# Patient Record
Sex: MALE | Race: ASIAN | NOT HISPANIC OR LATINO | URBAN - METROPOLITAN AREA
[De-identification: names, ages, dates, MRNs, and addresses within clinical notes are randomized per-mention and may not be internally consistent; named-entity substitution may affect disease eponyms.]

---

## 2018-08-10 ENCOUNTER — INPATIENT (INPATIENT)
Facility: HOSPITAL | Age: 38
LOS: 9 days | Discharge: HOME | End: 2018-08-20
Attending: PSYCHIATRY & NEUROLOGY | Admitting: PSYCHIATRY & NEUROLOGY
Payer: MEDICAID

## 2018-08-10 VITALS
TEMPERATURE: 99 F | DIASTOLIC BLOOD PRESSURE: 98 MMHG | SYSTOLIC BLOOD PRESSURE: 141 MMHG | HEART RATE: 90 BPM | RESPIRATION RATE: 16 BRPM | OXYGEN SATURATION: 100 %

## 2018-08-10 DIAGNOSIS — F10.10 ALCOHOL ABUSE, UNCOMPLICATED: ICD-10-CM

## 2018-08-10 DIAGNOSIS — F29 UNSPECIFIED PSYCHOSIS NOT DUE TO A SUBSTANCE OR KNOWN PHYSIOLOGICAL CONDITION: ICD-10-CM

## 2018-08-10 DIAGNOSIS — F20.9 SCHIZOPHRENIA, UNSPECIFIED: ICD-10-CM

## 2018-08-10 LAB
ALBUMIN SERPL ELPH-MCNC: 4.5 G/DL — SIGNIFICANT CHANGE UP (ref 3.3–5)
ALP SERPL-CCNC: 52 U/L — SIGNIFICANT CHANGE UP (ref 40–120)
ALT FLD-CCNC: 60 U/L — HIGH (ref 4–41)
AMPHET UR-MCNC: NEGATIVE — SIGNIFICANT CHANGE UP
APAP SERPL-MCNC: < 15 UG/ML — LOW (ref 15–25)
APPEARANCE UR: CLEAR — SIGNIFICANT CHANGE UP
AST SERPL-CCNC: 46 U/L — HIGH (ref 4–40)
BARBITURATES UR SCN-MCNC: NEGATIVE — SIGNIFICANT CHANGE UP
BASOPHILS # BLD AUTO: 0.05 K/UL — SIGNIFICANT CHANGE UP (ref 0–0.2)
BASOPHILS NFR BLD AUTO: 0.7 % — SIGNIFICANT CHANGE UP (ref 0–2)
BENZODIAZ UR-MCNC: NEGATIVE — SIGNIFICANT CHANGE UP
BILIRUB SERPL-MCNC: 0.4 MG/DL — SIGNIFICANT CHANGE UP (ref 0.2–1.2)
BILIRUB UR-MCNC: NEGATIVE — SIGNIFICANT CHANGE UP
BLOOD UR QL VISUAL: NEGATIVE — SIGNIFICANT CHANGE UP
BUN SERPL-MCNC: 14 MG/DL — SIGNIFICANT CHANGE UP (ref 7–23)
CALCIUM SERPL-MCNC: 8.8 MG/DL — SIGNIFICANT CHANGE UP (ref 8.4–10.5)
CANNABINOIDS UR-MCNC: NEGATIVE — SIGNIFICANT CHANGE UP
CHLORIDE SERPL-SCNC: 102 MMOL/L — SIGNIFICANT CHANGE UP (ref 98–107)
CO2 SERPL-SCNC: 21 MMOL/L — LOW (ref 22–31)
COCAINE METAB.OTHER UR-MCNC: NEGATIVE — SIGNIFICANT CHANGE UP
COLOR SPEC: COLORLESS — SIGNIFICANT CHANGE UP
CREAT SERPL-MCNC: 0.75 MG/DL — SIGNIFICANT CHANGE UP (ref 0.5–1.3)
EOSINOPHIL # BLD AUTO: 0.27 K/UL — SIGNIFICANT CHANGE UP (ref 0–0.5)
EOSINOPHIL NFR BLD AUTO: 4 % — SIGNIFICANT CHANGE UP (ref 0–6)
ETHANOL BLD-MCNC: < 10 MG/DL — SIGNIFICANT CHANGE UP
GLUCOSE SERPL-MCNC: 102 MG/DL — HIGH (ref 70–99)
GLUCOSE UR-MCNC: NEGATIVE — SIGNIFICANT CHANGE UP
HCT VFR BLD CALC: 44.4 % — SIGNIFICANT CHANGE UP (ref 39–50)
HGB BLD-MCNC: 15 G/DL — SIGNIFICANT CHANGE UP (ref 13–17)
IMM GRANULOCYTES # BLD AUTO: 0.01 # — SIGNIFICANT CHANGE UP
IMM GRANULOCYTES NFR BLD AUTO: 0.1 % — SIGNIFICANT CHANGE UP (ref 0–1.5)
KETONES UR-MCNC: NEGATIVE — SIGNIFICANT CHANGE UP
LEUKOCYTE ESTERASE UR-ACNC: NEGATIVE — SIGNIFICANT CHANGE UP
LYMPHOCYTES # BLD AUTO: 2.09 K/UL — SIGNIFICANT CHANGE UP (ref 1–3.3)
LYMPHOCYTES # BLD AUTO: 30.6 % — SIGNIFICANT CHANGE UP (ref 13–44)
MCHC RBC-ENTMCNC: 31.3 PG — SIGNIFICANT CHANGE UP (ref 27–34)
MCHC RBC-ENTMCNC: 33.8 % — SIGNIFICANT CHANGE UP (ref 32–36)
MCV RBC AUTO: 92.7 FL — SIGNIFICANT CHANGE UP (ref 80–100)
METHADONE UR-MCNC: NEGATIVE — SIGNIFICANT CHANGE UP
MONOCYTES # BLD AUTO: 0.5 K/UL — SIGNIFICANT CHANGE UP (ref 0–0.9)
MONOCYTES NFR BLD AUTO: 7.3 % — SIGNIFICANT CHANGE UP (ref 2–14)
NEUTROPHILS # BLD AUTO: 3.91 K/UL — SIGNIFICANT CHANGE UP (ref 1.8–7.4)
NEUTROPHILS NFR BLD AUTO: 57.3 % — SIGNIFICANT CHANGE UP (ref 43–77)
NITRITE UR-MCNC: NEGATIVE — SIGNIFICANT CHANGE UP
NRBC # FLD: 0 — SIGNIFICANT CHANGE UP
OPIATES UR-MCNC: NEGATIVE — SIGNIFICANT CHANGE UP
OXYCODONE UR-MCNC: NEGATIVE — SIGNIFICANT CHANGE UP
PCP UR-MCNC: NEGATIVE — SIGNIFICANT CHANGE UP
PH UR: 7 — SIGNIFICANT CHANGE UP (ref 4.6–8)
PLATELET # BLD AUTO: 303 K/UL — SIGNIFICANT CHANGE UP (ref 150–400)
PMV BLD: 9.1 FL — SIGNIFICANT CHANGE UP (ref 7–13)
POTASSIUM SERPL-MCNC: 3.8 MMOL/L — SIGNIFICANT CHANGE UP (ref 3.5–5.3)
POTASSIUM SERPL-SCNC: 3.8 MMOL/L — SIGNIFICANT CHANGE UP (ref 3.5–5.3)
PROT SERPL-MCNC: 7 G/DL — SIGNIFICANT CHANGE UP (ref 6–8.3)
PROT UR-MCNC: NEGATIVE MG/DL — SIGNIFICANT CHANGE UP
RBC # BLD: 4.79 M/UL — SIGNIFICANT CHANGE UP (ref 4.2–5.8)
RBC # FLD: 13 % — SIGNIFICANT CHANGE UP (ref 10.3–14.5)
SALICYLATES SERPL-MCNC: < 5 MG/DL — LOW (ref 15–30)
SODIUM SERPL-SCNC: 139 MMOL/L — SIGNIFICANT CHANGE UP (ref 135–145)
SP GR SPEC: 1.01 — SIGNIFICANT CHANGE UP (ref 1–1.04)
TSH SERPL-MCNC: 1.13 UIU/ML — SIGNIFICANT CHANGE UP (ref 0.27–4.2)
UROBILINOGEN FLD QL: NORMAL MG/DL — SIGNIFICANT CHANGE UP
WBC # BLD: 6.83 K/UL — SIGNIFICANT CHANGE UP (ref 3.8–10.5)
WBC # FLD AUTO: 6.83 K/UL — SIGNIFICANT CHANGE UP (ref 3.8–10.5)
WBC UR QL: SIGNIFICANT CHANGE UP (ref 0–?)

## 2018-08-10 PROCEDURE — 99222 1ST HOSP IP/OBS MODERATE 55: CPT

## 2018-08-10 PROCEDURE — 99285 EMERGENCY DEPT VISIT HI MDM: CPT

## 2018-08-10 RX ORDER — TRAZODONE HCL 50 MG
50 TABLET ORAL AT BEDTIME
Qty: 0 | Refills: 0 | Status: DISCONTINUED | OUTPATIENT
Start: 2018-08-10 | End: 2018-08-20

## 2018-08-10 RX ORDER — HALOPERIDOL DECANOATE 100 MG/ML
5 INJECTION INTRAMUSCULAR ONCE
Qty: 0 | Refills: 0 | Status: COMPLETED | OUTPATIENT
Start: 2018-08-10 | End: 2018-08-10

## 2018-08-10 RX ORDER — ATENOLOL 25 MG/1
100 TABLET ORAL DAILY
Qty: 0 | Refills: 0 | Status: DISCONTINUED | OUTPATIENT
Start: 2018-08-10 | End: 2018-08-20

## 2018-08-10 RX ORDER — DIPHENHYDRAMINE HCL 50 MG
25 CAPSULE ORAL EVERY 6 HOURS
Qty: 0 | Refills: 0 | Status: DISCONTINUED | OUTPATIENT
Start: 2018-08-10 | End: 2018-08-11

## 2018-08-10 RX ORDER — RISPERIDONE 4 MG/1
1 TABLET ORAL
Qty: 0 | Refills: 0 | Status: DISCONTINUED | OUTPATIENT
Start: 2018-08-10 | End: 2018-08-11

## 2018-08-10 RX ORDER — DIPHENHYDRAMINE HCL 50 MG
25 CAPSULE ORAL ONCE
Qty: 0 | Refills: 0 | Status: COMPLETED | OUTPATIENT
Start: 2018-08-10 | End: 2018-08-10

## 2018-08-10 RX ORDER — HALOPERIDOL DECANOATE 100 MG/ML
2 INJECTION INTRAMUSCULAR THREE TIMES A DAY
Qty: 0 | Refills: 0 | Status: DISCONTINUED | OUTPATIENT
Start: 2018-08-10 | End: 2018-08-11

## 2018-08-10 RX ORDER — NICOTINE POLACRILEX 2 MG
2 GUM BUCCAL
Qty: 0 | Refills: 0 | Status: DISCONTINUED | OUTPATIENT
Start: 2018-08-10 | End: 2018-08-20

## 2018-08-10 RX ORDER — BENZTROPINE MESYLATE 1 MG
0.5 TABLET ORAL
Qty: 0 | Refills: 0 | Status: DISCONTINUED | OUTPATIENT
Start: 2018-08-10 | End: 2018-08-20

## 2018-08-10 RX ORDER — IBUPROFEN 200 MG
600 TABLET ORAL ONCE
Qty: 0 | Refills: 0 | Status: COMPLETED | OUTPATIENT
Start: 2018-08-10 | End: 2018-08-10

## 2018-08-10 RX ADMIN — Medication 600 MILLIGRAM(S): at 22:50

## 2018-08-10 RX ADMIN — Medication 25 MILLIGRAM(S): at 05:08

## 2018-08-10 RX ADMIN — Medication 50 MILLIGRAM(S): at 21:50

## 2018-08-10 RX ADMIN — ATENOLOL 100 MILLIGRAM(S): 25 TABLET ORAL at 12:02

## 2018-08-10 RX ADMIN — Medication 0.5 MILLIGRAM(S): at 21:25

## 2018-08-10 RX ADMIN — Medication 600 MILLIGRAM(S): at 21:50

## 2018-08-10 RX ADMIN — RISPERIDONE 1 MILLIGRAM(S): 4 TABLET ORAL at 21:25

## 2018-08-10 RX ADMIN — Medication 25 MILLIGRAM(S): at 21:50

## 2018-08-10 RX ADMIN — Medication 2 MILLIGRAM(S): at 05:07

## 2018-08-10 RX ADMIN — HALOPERIDOL DECANOATE 5 MILLIGRAM(S): 100 INJECTION INTRAMUSCULAR at 05:07

## 2018-08-10 RX ADMIN — Medication 2 MILLIGRAM(S): at 22:53

## 2018-08-10 NOTE — ED BEHAVIORAL HEALTH ASSESSMENT NOTE - OTHER PAST PSYCHIATRIC HISTORY (INCLUDE DETAILS REGARDING ONSET, COURSE OF ILLNESS, INPATIENT/OUTPATIENT TREATMENT)
one psych admission after patient had a episode of agitaion; he felt "discriminated and decided to fix it with love" as per the pt He went to the "lady's house- backyard ?; and refused to leave Police took him to the hospital and patient was admitted for 4 days HE was started on meds but became non-compliant

## 2018-08-10 NOTE — ED PROVIDER NOTE - OBJECTIVE STATEMENT
38M per pt - has PMH depression, presents to ED bec family convinced him to come to ED 2/2 pt yelling tonight and they think "I'm crazy." States he started some meds 4d ago for his depression but not sure what they are. Denies any current SI/HI but states 2-3w he tried hurting himself, but when I attempted to ask further questions, pt refused to talk about it. Denies HA, SOB/CP, URI symptoms, vision changes, weakness/numbness, abd pain, urinary complaints. Denies SI/HI, toxic ingestions. Does admit to etoh, drank beer today.   Per family (cousin, friend and dad w/ pt): Pt recently admitted to psych at [facility], was dx w/ possible bipolar/schizophrenia and started on risperidone, benztropine, doxepin but they don't think hes taking it. 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 also increasingly agitated and starting up with random people in the street.   Cousin Chato jailene [phone]

## 2018-08-10 NOTE — ED BEHAVIORAL HEALTH ASSESSMENT NOTE - CURRENT MEDICATION
non-compliant :he is supposed to be on doxepin 50 mg  QHS   Risperdal 1 mg BID   cogentin 0.5 mg BID  atenolol 100 mg daily

## 2018-08-10 NOTE — ED ADULT NURSE NOTE - NSIMPLEMENTINTERV_GEN_ALL_ED
Implemented All Universal Safety Interventions:  [place] to call system. Call bell, personal items and telephone within reach. Instruct patient to call for assistance. Room bathroom lighting operational. Non-slip footwear when patient is off stretcher. Physically safe environment: no spills, clutter or unnecessary equipment. Stretcher in lowest position, wheels locked, appropriate side rails in place.

## 2018-08-10 NOTE — ED BEHAVIORAL HEALTH ASSESSMENT NOTE - SUMMARY
39 yo  male with one psych admission ; currently psychotic, bizarre, with some manic symptoms, non-compliant with meds family is worried about him; daughter and wife are scared of him  Patient needs psychiatric hospitalization for stabilization and safety

## 2018-08-10 NOTE — ED ADULT NURSE REASSESSMENT NOTE - NS ED NURSE REASSESS COMMENT FT1
Pt becoming agitated and restless; pt banging his head on the wall, pacing and wringing hands with increasing internal preoccupation.  Pt medicated as ordered.
pt left with EMS to   pt calm & cooperative no c/o verbalized pt aware of admission.
Break Coverage RN - Report received from  RN Antione Dutton. No acute distress at present. Respirations are even and unlabored on room air. EKG in progress. Will continue to monitor.
Pt sleeping; respirations even and non labored. Pt awaiting admission bed at [facility].
Pt transfer vitals as noted; report called to [facility] on [address] at [facility]; pt awaiting transfer to admission bed via [facility].

## 2018-08-10 NOTE — ED PROVIDER NOTE - MEDICAL DECISION MAKING DETAILS
38M PMH ?depresison vs. bioplar/schiophrenia p/w increasing agitation, family concern for delusions, increasing self-harm behaviors. Pt denies any current symptoms but admits to recent SI and very evasive with further questioning. Admits to etoh. Vitals wnl, exam as above.  ddx: Likely combo psych and substance use. May be slight etoh intox.   CBC, cmp, TSh, tox. EKG. Given family concerns and pt evasiveness, likely medical clearance for further BH eval.

## 2018-08-10 NOTE — ED ADULT TRIAGE NOTE - CHIEF COMPLAINT QUOTE
pt drank 8 beers total today since this morning. as per family pt is supposed to be on risperidone and is noncompliant. pt drinks daily since february. family brought him here for "help controlling his anger and drinking". pt calm in triage. pmh htn, possible schizophrenia/bipolar pt drank 8 beers total today since this morning. as per family pt is supposed to be on risperidone and is noncompliant. pt drinks daily since february. family brought him here for "help controlling his anger and drinking". pt calm in triage. denies SI/HI, denies drug use. pt family states he has expressed SI in the past but pt denies. pmh htn, possible schizophrenia/bipolar
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
## 2018-08-10 NOTE — ED BEHAVIORAL HEALTH ASSESSMENT NOTE - DESCRIPTION (FIRST USE, LAST USE, QUANTITY, FREQUENCY, DURATION)
abuse vs dependence, lately he ahs been drinking 2-3 beers "almost every day" No HX of w/d  Patient also has Hx of "liver failure as per the cousin ; he "was in coma after he took cold medicine with alcohol; but suddenly the liver started working again"

## 2018-08-10 NOTE — ED BEHAVIORAL HEALTH ASSESSMENT NOTE - SUICIDE PROTECTIVE FACTORS
Responsibility to family and others/Supportive social network or family/Identifies reasons for living/High spirituality/Future oriented

## 2018-08-10 NOTE — ED BEHAVIORAL HEALTH ASSESSMENT NOTE - SUICIDE RISK FACTORS
Mood episode/Other/Access to means (pills, firearms, etc.)/Substance abuse/dependence/Agitation/severe anxiety/Unable to engage in safety planning

## 2018-08-10 NOTE — ED PROVIDER NOTE - PHYSICAL EXAMINATION
no LE edema, normal equal distal pulses.  No clonus, rigidity, tremors, fasciculations. PERRL, EOMI, no nystagmus. Strength 5/5. Steady unassisted gait. Normal bowel sounds, skin temp/color.  Slight AOB.
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
## 2018-08-10 NOTE — ED BEHAVIORAL HEALTH ASSESSMENT NOTE - RISK ASSESSMENT
risk factors: poor insight; non-compliance with meds, acute psychosis , mood symptoms  protective factors: No hx of SA, supportive family

## 2018-08-10 NOTE — ED ADULT NURSE NOTE - CHIEF COMPLAINT QUOTE
pt drank 8 beers total today since this morning. as per family pt is supposed to be on risperidone and is noncompliant. pt drinks daily since february. family brought him here for "help controlling his anger and drinking". pt calm in triage. denies SI/HI, denies drug use. pt family states he has expressed SI in the past but pt denies. pmh htn, possible schizophrenia/bipolar

## 2018-08-11 PROCEDURE — 99232 SBSQ HOSP IP/OBS MODERATE 35: CPT

## 2018-08-11 RX ORDER — DIPHENHYDRAMINE HCL 50 MG
25 CAPSULE ORAL ONCE
Qty: 0 | Refills: 0 | Status: DISCONTINUED | OUTPATIENT
Start: 2018-08-11 | End: 2018-08-11

## 2018-08-11 RX ORDER — DIPHENHYDRAMINE HCL 50 MG
50 CAPSULE ORAL ONCE
Qty: 0 | Refills: 0 | Status: DISCONTINUED | OUTPATIENT
Start: 2018-08-11 | End: 2018-08-20

## 2018-08-11 RX ORDER — RISPERIDONE 4 MG/1
3 TABLET ORAL AT BEDTIME
Qty: 0 | Refills: 0 | Status: DISCONTINUED | OUTPATIENT
Start: 2018-08-11 | End: 2018-08-12

## 2018-08-11 RX ORDER — DIPHENHYDRAMINE HCL 50 MG
50 CAPSULE ORAL EVERY 6 HOURS
Qty: 0 | Refills: 0 | Status: DISCONTINUED | OUTPATIENT
Start: 2018-08-11 | End: 2018-08-20

## 2018-08-11 RX ORDER — ACETAMINOPHEN 500 MG
650 TABLET ORAL EVERY 6 HOURS
Qty: 0 | Refills: 0 | Status: DISCONTINUED | OUTPATIENT
Start: 2018-08-11 | End: 2018-08-17

## 2018-08-11 RX ORDER — HALOPERIDOL DECANOATE 100 MG/ML
5 INJECTION INTRAMUSCULAR EVERY 6 HOURS
Qty: 0 | Refills: 0 | Status: DISCONTINUED | OUTPATIENT
Start: 2018-08-11 | End: 2018-08-20

## 2018-08-11 RX ORDER — HALOPERIDOL DECANOATE 100 MG/ML
5 INJECTION INTRAMUSCULAR ONCE
Qty: 0 | Refills: 0 | Status: DISCONTINUED | OUTPATIENT
Start: 2018-08-11 | End: 2018-08-20

## 2018-08-11 RX ADMIN — Medication 50 MILLIGRAM(S): at 20:52

## 2018-08-11 RX ADMIN — Medication 0.5 MILLIGRAM(S): at 09:28

## 2018-08-11 RX ADMIN — Medication 50 MILLIGRAM(S): at 03:47

## 2018-08-11 RX ADMIN — HALOPERIDOL DECANOATE 5 MILLIGRAM(S): 100 INJECTION INTRAMUSCULAR at 20:52

## 2018-08-11 RX ADMIN — Medication 50 MILLIGRAM(S): at 09:25

## 2018-08-11 RX ADMIN — ATENOLOL 100 MILLIGRAM(S): 25 TABLET ORAL at 09:28

## 2018-08-11 RX ADMIN — HALOPERIDOL DECANOATE 5 MILLIGRAM(S): 100 INJECTION INTRAMUSCULAR at 09:25

## 2018-08-11 RX ADMIN — Medication 650 MILLIGRAM(S): at 09:20

## 2018-08-11 RX ADMIN — Medication 1 DROP(S): at 11:30

## 2018-08-11 RX ADMIN — Medication 0.5 MILLIGRAM(S): at 21:16

## 2018-08-11 RX ADMIN — Medication 650 MILLIGRAM(S): at 10:44

## 2018-08-11 RX ADMIN — Medication 2 MILLIGRAM(S): at 20:52

## 2018-08-11 RX ADMIN — RISPERIDONE 3 MILLIGRAM(S): 4 TABLET ORAL at 21:16

## 2018-08-11 RX ADMIN — Medication 2 MILLIGRAM(S): at 09:25

## 2018-08-11 RX ADMIN — Medication 2 MILLIGRAM(S): at 17:00

## 2018-08-11 RX ADMIN — Medication 25 MILLIGRAM(S): at 03:47

## 2018-08-12 PROCEDURE — 99231 SBSQ HOSP IP/OBS SF/LOW 25: CPT

## 2018-08-12 RX ORDER — NICOTINE POLACRILEX 2 MG
1 GUM BUCCAL DAILY
Qty: 0 | Refills: 0 | Status: DISCONTINUED | OUTPATIENT
Start: 2018-08-12 | End: 2018-08-20

## 2018-08-12 RX ORDER — RISPERIDONE 4 MG/1
4 TABLET ORAL AT BEDTIME
Qty: 0 | Refills: 0 | Status: DISCONTINUED | OUTPATIENT
Start: 2018-08-12 | End: 2018-08-20

## 2018-08-12 RX ADMIN — Medication 2 MILLIGRAM(S): at 21:13

## 2018-08-12 RX ADMIN — Medication 0.5 MILLIGRAM(S): at 09:34

## 2018-08-12 RX ADMIN — HALOPERIDOL DECANOATE 5 MILLIGRAM(S): 100 INJECTION INTRAMUSCULAR at 21:13

## 2018-08-12 RX ADMIN — ATENOLOL 100 MILLIGRAM(S): 25 TABLET ORAL at 09:34

## 2018-08-12 RX ADMIN — Medication 1 PATCH: at 13:23

## 2018-08-12 RX ADMIN — Medication 50 MILLIGRAM(S): at 21:13

## 2018-08-12 RX ADMIN — Medication 0.5 MILLIGRAM(S): at 21:17

## 2018-08-12 RX ADMIN — RISPERIDONE 4 MILLIGRAM(S): 4 TABLET ORAL at 21:17

## 2018-08-13 PROCEDURE — 99232 SBSQ HOSP IP/OBS MODERATE 35: CPT

## 2018-08-13 RX ORDER — IBUPROFEN 200 MG
400 TABLET ORAL EVERY 6 HOURS
Qty: 0 | Refills: 0 | Status: DISCONTINUED | OUTPATIENT
Start: 2018-08-13 | End: 2018-08-20

## 2018-08-13 RX ADMIN — Medication 0.5 MILLIGRAM(S): at 09:18

## 2018-08-13 RX ADMIN — Medication 650 MILLIGRAM(S): at 11:54

## 2018-08-13 RX ADMIN — Medication 0.5 MILLIGRAM(S): at 21:23

## 2018-08-13 RX ADMIN — ATENOLOL 100 MILLIGRAM(S): 25 TABLET ORAL at 09:18

## 2018-08-13 RX ADMIN — Medication 2 MILLIGRAM(S): at 21:23

## 2018-08-13 RX ADMIN — Medication 50 MILLIGRAM(S): at 01:00

## 2018-08-13 RX ADMIN — Medication 650 MILLIGRAM(S): at 11:25

## 2018-08-13 RX ADMIN — Medication 400 MILLIGRAM(S): at 15:31

## 2018-08-13 RX ADMIN — Medication 400 MILLIGRAM(S): at 14:31

## 2018-08-13 RX ADMIN — RISPERIDONE 4 MILLIGRAM(S): 4 TABLET ORAL at 21:23

## 2018-08-13 RX ADMIN — Medication 1 PATCH: at 09:18

## 2018-08-14 PROCEDURE — 99232 SBSQ HOSP IP/OBS MODERATE 35: CPT | Mod: GC

## 2018-08-14 RX ADMIN — Medication 0.5 MILLIGRAM(S): at 08:22

## 2018-08-14 RX ADMIN — ATENOLOL 100 MILLIGRAM(S): 25 TABLET ORAL at 08:22

## 2018-08-14 RX ADMIN — Medication 1 PATCH: at 08:22

## 2018-08-14 RX ADMIN — Medication 50 MILLIGRAM(S): at 21:56

## 2018-08-14 RX ADMIN — Medication 0.5 MILLIGRAM(S): at 21:09

## 2018-08-14 RX ADMIN — Medication 400 MILLIGRAM(S): at 08:22

## 2018-08-14 RX ADMIN — RISPERIDONE 4 MILLIGRAM(S): 4 TABLET ORAL at 21:09

## 2018-08-14 RX ADMIN — HALOPERIDOL DECANOATE 5 MILLIGRAM(S): 100 INJECTION INTRAMUSCULAR at 23:50

## 2018-08-14 RX ADMIN — Medication 400 MILLIGRAM(S): at 09:13

## 2018-08-14 RX ADMIN — Medication 2 MILLIGRAM(S): at 23:50

## 2018-08-14 RX ADMIN — Medication 400 MILLIGRAM(S): at 18:14

## 2018-08-15 PROCEDURE — 99232 SBSQ HOSP IP/OBS MODERATE 35: CPT | Mod: GC

## 2018-08-15 RX ADMIN — Medication 50 MILLIGRAM(S): at 22:35

## 2018-08-15 RX ADMIN — ATENOLOL 100 MILLIGRAM(S): 25 TABLET ORAL at 09:18

## 2018-08-15 RX ADMIN — Medication 0.5 MILLIGRAM(S): at 21:18

## 2018-08-15 RX ADMIN — Medication 400 MILLIGRAM(S): at 14:32

## 2018-08-15 RX ADMIN — HALOPERIDOL DECANOATE 5 MILLIGRAM(S): 100 INJECTION INTRAMUSCULAR at 22:35

## 2018-08-15 RX ADMIN — RISPERIDONE 4 MILLIGRAM(S): 4 TABLET ORAL at 21:18

## 2018-08-15 RX ADMIN — Medication 1 PATCH: at 09:19

## 2018-08-15 RX ADMIN — Medication 0.5 MILLIGRAM(S): at 09:18

## 2018-08-15 RX ADMIN — Medication 2 MILLIGRAM(S): at 17:19

## 2018-08-15 RX ADMIN — Medication 400 MILLIGRAM(S): at 15:34

## 2018-08-16 PROCEDURE — 99232 SBSQ HOSP IP/OBS MODERATE 35: CPT | Mod: GC

## 2018-08-16 RX ORDER — BENZOCAINE AND MENTHOL 5; 1 G/100ML; G/100ML
1 LIQUID ORAL THREE TIMES A DAY
Qty: 0 | Refills: 0 | Status: DISCONTINUED | OUTPATIENT
Start: 2018-08-16 | End: 2018-08-20

## 2018-08-16 RX ORDER — RISPERIDONE 4 MG/1
1 TABLET ORAL
Qty: 14 | Refills: 0
Start: 2018-08-16 | End: 2018-08-29

## 2018-08-16 RX ORDER — ATENOLOL 25 MG/1
1 TABLET ORAL
Qty: 14 | Refills: 0
Start: 2018-08-16 | End: 2018-08-29

## 2018-08-16 RX ORDER — BENZTROPINE MESYLATE 1 MG
1 TABLET ORAL
Qty: 28 | Refills: 0
Start: 2018-08-16 | End: 2018-08-29

## 2018-08-16 RX ADMIN — Medication 0.5 MILLIGRAM(S): at 08:43

## 2018-08-16 RX ADMIN — Medication 50 MILLIGRAM(S): at 11:05

## 2018-08-16 RX ADMIN — Medication 400 MILLIGRAM(S): at 17:00

## 2018-08-16 RX ADMIN — Medication 2 MILLIGRAM(S): at 21:46

## 2018-08-16 RX ADMIN — HALOPERIDOL DECANOATE 5 MILLIGRAM(S): 100 INJECTION INTRAMUSCULAR at 21:46

## 2018-08-16 RX ADMIN — Medication 400 MILLIGRAM(S): at 09:43

## 2018-08-16 RX ADMIN — HALOPERIDOL DECANOATE 5 MILLIGRAM(S): 100 INJECTION INTRAMUSCULAR at 11:05

## 2018-08-16 RX ADMIN — Medication 1 PATCH: at 08:43

## 2018-08-16 RX ADMIN — Medication 50 MILLIGRAM(S): at 21:46

## 2018-08-16 RX ADMIN — Medication 400 MILLIGRAM(S): at 16:00

## 2018-08-16 RX ADMIN — BENZOCAINE AND MENTHOL 1 LOZENGE: 5; 1 LIQUID ORAL at 06:38

## 2018-08-16 RX ADMIN — Medication 400 MILLIGRAM(S): at 08:43

## 2018-08-16 RX ADMIN — Medication 2 MILLIGRAM(S): at 00:00

## 2018-08-16 RX ADMIN — RISPERIDONE 4 MILLIGRAM(S): 4 TABLET ORAL at 20:53

## 2018-08-16 RX ADMIN — ATENOLOL 100 MILLIGRAM(S): 25 TABLET ORAL at 08:43

## 2018-08-16 RX ADMIN — Medication 2 MILLIGRAM(S): at 11:05

## 2018-08-16 RX ADMIN — BENZOCAINE AND MENTHOL 1 LOZENGE: 5; 1 LIQUID ORAL at 18:28

## 2018-08-16 RX ADMIN — Medication 0.5 MILLIGRAM(S): at 20:53

## 2018-08-17 PROCEDURE — 99232 SBSQ HOSP IP/OBS MODERATE 35: CPT | Mod: GC

## 2018-08-17 RX ADMIN — Medication 400 MILLIGRAM(S): at 13:57

## 2018-08-17 RX ADMIN — HALOPERIDOL DECANOATE 5 MILLIGRAM(S): 100 INJECTION INTRAMUSCULAR at 21:00

## 2018-08-17 RX ADMIN — BENZOCAINE AND MENTHOL 1 LOZENGE: 5; 1 LIQUID ORAL at 21:00

## 2018-08-17 RX ADMIN — Medication 1 PATCH: at 08:36

## 2018-08-17 RX ADMIN — ATENOLOL 100 MILLIGRAM(S): 25 TABLET ORAL at 08:36

## 2018-08-17 RX ADMIN — Medication 2 MILLIGRAM(S): at 21:00

## 2018-08-17 RX ADMIN — Medication 50 MILLIGRAM(S): at 21:00

## 2018-08-17 RX ADMIN — Medication 650 MILLIGRAM(S): at 08:36

## 2018-08-17 RX ADMIN — Medication 400 MILLIGRAM(S): at 21:34

## 2018-08-17 RX ADMIN — Medication 0.5 MILLIGRAM(S): at 08:36

## 2018-08-17 RX ADMIN — Medication 400 MILLIGRAM(S): at 21:00

## 2018-08-17 RX ADMIN — RISPERIDONE 4 MILLIGRAM(S): 4 TABLET ORAL at 21:08

## 2018-08-17 RX ADMIN — BENZOCAINE AND MENTHOL 1 LOZENGE: 5; 1 LIQUID ORAL at 08:36

## 2018-08-17 RX ADMIN — Medication 400 MILLIGRAM(S): at 13:53

## 2018-08-17 RX ADMIN — Medication 0.5 MILLIGRAM(S): at 21:08

## 2018-08-18 RX ORDER — ACETAMINOPHEN 500 MG
325 TABLET ORAL EVERY 6 HOURS
Qty: 0 | Refills: 0 | Status: DISCONTINUED | OUTPATIENT
Start: 2018-08-18 | End: 2018-08-20

## 2018-08-18 RX ORDER — ACETAMINOPHEN 500 MG
500 TABLET ORAL EVERY 6 HOURS
Qty: 0 | Refills: 0 | Status: DISCONTINUED | OUTPATIENT
Start: 2018-08-18 | End: 2018-08-18

## 2018-08-18 RX ADMIN — Medication 400 MILLIGRAM(S): at 11:40

## 2018-08-18 RX ADMIN — Medication 1 PATCH: at 08:57

## 2018-08-18 RX ADMIN — Medication 400 MILLIGRAM(S): at 18:49

## 2018-08-18 RX ADMIN — Medication 0.5 MILLIGRAM(S): at 08:57

## 2018-08-18 RX ADMIN — ATENOLOL 100 MILLIGRAM(S): 25 TABLET ORAL at 08:57

## 2018-08-18 RX ADMIN — Medication 400 MILLIGRAM(S): at 10:40

## 2018-08-18 RX ADMIN — Medication 50 MILLIGRAM(S): at 20:55

## 2018-08-18 RX ADMIN — Medication 0.5 MILLIGRAM(S): at 20:52

## 2018-08-18 RX ADMIN — RISPERIDONE 4 MILLIGRAM(S): 4 TABLET ORAL at 20:52

## 2018-08-18 RX ADMIN — HALOPERIDOL DECANOATE 5 MILLIGRAM(S): 100 INJECTION INTRAMUSCULAR at 20:55

## 2018-08-18 RX ADMIN — Medication 400 MILLIGRAM(S): at 18:00

## 2018-08-19 RX ADMIN — Medication 0.5 MILLIGRAM(S): at 20:46

## 2018-08-19 RX ADMIN — ATENOLOL 100 MILLIGRAM(S): 25 TABLET ORAL at 09:17

## 2018-08-19 RX ADMIN — Medication 400 MILLIGRAM(S): at 07:45

## 2018-08-19 RX ADMIN — Medication 1 PATCH: at 09:17

## 2018-08-19 RX ADMIN — Medication 50 MILLIGRAM(S): at 22:17

## 2018-08-19 RX ADMIN — Medication 50 MILLIGRAM(S): at 21:26

## 2018-08-19 RX ADMIN — RISPERIDONE 4 MILLIGRAM(S): 4 TABLET ORAL at 20:46

## 2018-08-19 RX ADMIN — Medication 400 MILLIGRAM(S): at 08:45

## 2018-08-19 RX ADMIN — Medication 0.5 MILLIGRAM(S): at 09:17

## 2018-08-20 VITALS — TEMPERATURE: 98 F

## 2018-08-20 LAB
CHOLEST SERPL-MCNC: 227 MG/DL — HIGH (ref 120–199)
HBA1C BLD-MCNC: 5.3 % — SIGNIFICANT CHANGE UP (ref 4–5.6)
HDLC SERPL-MCNC: 38 MG/DL — SIGNIFICANT CHANGE UP (ref 35–55)
LIPID PNL WITH DIRECT LDL SERPL: 152 MG/DL — SIGNIFICANT CHANGE UP
TRIGL SERPL-MCNC: 393 MG/DL — HIGH (ref 10–149)

## 2018-08-20 PROCEDURE — 99238 HOSP IP/OBS DSCHRG MGMT 30/<: CPT

## 2018-08-20 RX ADMIN — Medication 1 PATCH: at 09:49

## 2018-08-20 RX ADMIN — Medication 0.5 MILLIGRAM(S): at 09:48

## 2018-08-20 RX ADMIN — ATENOLOL 100 MILLIGRAM(S): 25 TABLET ORAL at 09:48

## 2018-08-20 RX ADMIN — Medication 1 PATCH: at 09:48

## 2018-08-27 ENCOUNTER — OUTPATIENT (OUTPATIENT)
Dept: OUTPATIENT SERVICES | Facility: HOSPITAL | Age: 38
LOS: 1 days | Discharge: PSYCHIATRIC OR SUBSTANCE USE FACILITY | End: 2018-08-27

## 2018-08-28 DIAGNOSIS — F20.81 SCHIZOPHRENIFORM DISORDER: ICD-10-CM

## 2021-07-08 ENCOUNTER — EMERGENCY (EMERGENCY)
Facility: HOSPITAL | Age: 41
LOS: 1 days | End: 2021-07-08
Admitting: EMERGENCY MEDICINE
Payer: COMMERCIAL

## 2021-07-08 PROCEDURE — 70450 CT HEAD/BRAIN W/O DYE: CPT | Mod: 26

## 2021-07-08 PROCEDURE — 99285 EMERGENCY DEPT VISIT HI MDM: CPT

## 2021-07-08 PROCEDURE — 93010 ELECTROCARDIOGRAM REPORT: CPT

## 2021-07-09 ENCOUNTER — INPATIENT (INPATIENT)
Facility: HOSPITAL | Age: 41
LOS: 10 days | Discharge: HOME | End: 2021-07-20
Attending: PSYCHIATRY & NEUROLOGY | Admitting: PSYCHIATRY & NEUROLOGY
Payer: COMMERCIAL

## 2021-07-09 VITALS — HEIGHT: 71 IN | OXYGEN SATURATION: 100 % | RESPIRATION RATE: 16 BRPM | TEMPERATURE: 97 F | WEIGHT: 235.89 LBS

## 2021-07-09 DIAGNOSIS — F33.9 MAJOR DEPRESSIVE DISORDER, RECURRENT, UNSPECIFIED: ICD-10-CM

## 2021-07-09 PROCEDURE — 99222 1ST HOSP IP/OBS MODERATE 55: CPT

## 2021-07-09 PROCEDURE — 90792 PSYCH DIAG EVAL W/MED SRVCS: CPT | Mod: 95

## 2021-07-09 RX ORDER — DIPHENHYDRAMINE HCL 50 MG
50 CAPSULE ORAL ONCE
Refills: 0 | Status: DISCONTINUED | OUTPATIENT
Start: 2021-07-09 | End: 2021-07-20

## 2021-07-09 RX ORDER — DIPHENHYDRAMINE HCL 50 MG
50 CAPSULE ORAL EVERY 6 HOURS
Refills: 0 | Status: DISCONTINUED | OUTPATIENT
Start: 2021-07-09 | End: 2021-07-20

## 2021-07-09 RX ORDER — HALOPERIDOL DECANOATE 100 MG/ML
5 INJECTION INTRAMUSCULAR EVERY 6 HOURS
Refills: 0 | Status: DISCONTINUED | OUTPATIENT
Start: 2021-07-09 | End: 2021-07-20

## 2021-07-09 RX ORDER — RISPERIDONE 4 MG/1
1 TABLET ORAL AT BEDTIME
Refills: 0 | Status: DISCONTINUED | OUTPATIENT
Start: 2021-07-09 | End: 2021-07-11

## 2021-07-09 RX ORDER — HALOPERIDOL DECANOATE 100 MG/ML
5 INJECTION INTRAMUSCULAR ONCE
Refills: 0 | Status: DISCONTINUED | OUTPATIENT
Start: 2021-07-09 | End: 2021-07-20

## 2021-07-09 RX ADMIN — Medication 2 MILLIGRAM(S): at 22:20

## 2021-07-09 RX ADMIN — Medication 50 MILLIGRAM(S): at 22:20

## 2021-07-09 NOTE — BH INPATIENT PSYCHIATRY ASSESSMENT NOTE - NSTXPSYCHOGOAL_PSY_ALL_CORE
Will be able to report experiencing hallucinations to staff Will engage in a 15 minute conversation with no irrational content

## 2021-07-09 NOTE — BH INPATIENT PSYCHIATRY ASSESSMENT NOTE - NSBHASSESSSUMMFT_PSY_ALL_CORE
41 year old   male with at least 2 previous psychiatric admissions (last at [facility] in 8/2018) where he was diagnosed with schizophreniform disorder , has been non complaint with treatment, with a past medical history significant for HTN, no history of violence, no legal issues, lives with 18 year old daughter who presented to the [place] ed for complaints of generalized body  aches and  headaches.  While in the waiting area, he was noticed to be acting bizarrely, making statements about the “holy spirit”.  When psychiatry assessed the patient and “asked what that was about, patient reports the “holy spirit talks to him, and is here with him, kneel down and pray.   He is vague about how they communicate to him, but patient report they speak to him”.  On eval at [facility], patient delusional, responding to internal stimuli, with a thought disorder 41 year old   male with at least 2 previous psychiatric admissions (last at [facility] in 8/2018) where he was diagnosed with schizophreniform disorder , has been non complaint with treatment, with a past medical history significant for HTN, no history of violence, no legal issues, lives with 18 year old daughter who presented to the [place] ed for complaints of generalized body  aches and  headaches.  While in the waiting area, he was noticed to be acting bizarrely, making statements about the “holy spirit”.  When psychiatry assessed the patient and “asked what that was about, patient reports the “holy spirit talks to him, and is here with him, kneel down and pray.   He is vague about how they communicate to him, but patient report they speak to him”.  On eval at [facility], patient delusional, with a thought disorder.   He requires acute inpatient hospitalization for safety and stabilization    will admit to inpatient psychiatry  q15 minutes checks.  patient does not require a 1:1 as not suicidal or homicidal , able to make his needs known  regular diet  activities ad milla  no sharps or shoelaces  will start risperdal 1mg hs  will have medicine evaluate to see whther patient will need standing hypertensives  haldol and ativan prn for agitation and anxiety respectively

## 2021-07-09 NOTE — BH INPATIENT PSYCHIATRY ASSESSMENT NOTE - NSBHCHARTREVIEWINVESTIGATE_PSY_A_CORE FT
HR 66   Nsaids Pregnancy And Lactation Text: These medications are considered safe up to 30 weeks gestation. It is excreted in breast milk.

## 2021-07-09 NOTE — BH INPATIENT PSYCHIATRY ASSESSMENT NOTE - DESCRIPTION
Lives with 19 yo daughter. Previously employed as . No legal history, no abuse history, no Lives with 19 yo daughter and wife. Previously employed as , but now reports working as a . No legal history, no abuse history, no

## 2021-07-09 NOTE — BH PATIENT PROFILE - HOME MEDICATIONS
atenolol 100 mg oral tablet , 1 tab(s) orally once a day  risperiDONE 4 mg oral tablet , 1 tab(s) orally once a day (at bedtime)  benztropine 0.5 mg oral tablet , 1 tab(s) orally 2 times a day

## 2021-07-09 NOTE — BH INPATIENT PSYCHIATRY ASSESSMENT NOTE - RISK ASSESSMENT
Patient with psychotic sxs (delusions, hallucinations), noncompliant with treatment.  he current denies si/hi with no I/i/p.  His protective factors include taking care of daughter

## 2021-07-09 NOTE — BH INPATIENT PSYCHIATRY ASSESSMENT NOTE - OTHER PAST PSYCHIATRIC HISTORY (INCLUDE DETAILS REGARDING ONSET, COURSE OF ILLNESS, INPATIENT/OUTPATIENT TREATMENT)
two  previous psych admissions in 2018 and denies any since then.  He also attended the partial program at [facility] upon discharge from [facility].  On previous admission to [facility] patient was floridly psychotic, with disorganization of thought/speech, hyperreligious with Messiah delusion about himself. He described stressors of mother being diagnosed with breast cancer. Pt also talked about being "betrayed" by his wife and daughter and how they did not "respect him," and how he wanted to leave his family.   Hospital records were obtained from  [facility] hospitalization 7/22/2018-7/27/2018 indicated very similar presentation with mix of psychotic and manic-like symptoms, but was eventually diagnosed with brief psychotic episode and stabilized on risperidone. During his hospitalization at [facility], patient had gradual attenuation of his delusions and disorganiation, while he still spoke of wanting to spread peace and love, he became more rational about this.    He still had some odd behaviors, would be cleaning frequently in dining area (was thought to be possible [employer]) and was also constantly bowing and shaking hands with staff (this was just exaggerated cultural behavior - known as "insah" in [place]). Pt would also make provocative romantic statements to select staff, but this did not emerge until pt was near his baseline, in absence of any other overt psychotic or manic symptoms, and he responded to redirection and ceased this behavior.   He was discharged on Risperdal 4mg daily two  previous psych admissions in 2018 and denies any since then.  He also attended the partial program at [facility] upon discharge from [facility].  On previous admission to [facility] patient was floridly psychotic, with disorganization of thought/speech, hyperreligious with Messiah delusion about himself. He described stressors of mother being diagnosed with breast cancer. Pt also talked about being "betrayed" by his wife and daughter and how they did not "respect him," and how he wanted to leave his family.   Hospital records were obtained from  [facility] hospitalization 7/22/2018-7/27/2018 indicated very similar presentation with mix of psychotic and manic-like symptoms, but was eventually diagnosed with brief psychotic episode and stabilized on risperidone. During his hospitalization at [facility], patient had gradual attenuation of his delusions and disorganization, while he still spoke of wanting to spread peace and love, he became more rational about this.    He still had some odd behaviors, would be cleaning frequently in dining area (was thought to be possible [employer]) and was also constantly bowing and shaking hands with staff (this was just exaggerated cultural behavior - known as "insah" in [place]). Pt would also make provocative romantic statements to select staff, but this did not emerge until pt was near his baseline, in absence of any other overt psychotic or manic symptoms, and he responded to redirection and ceased this behavior.   He was discharged on Risperdal 4mg daily

## 2021-07-09 NOTE — BH INPATIENT PSYCHIATRY ASSESSMENT NOTE - HPI (INCLUDE ILLNESS QUALITY, SEVERITY, DURATION, TIMING, CONTEXT, MODIFYING FACTORS, ASSOCIATED SIGNS AND SYMPTOMS)
SUBJECTIVE:   Saima Laurent is a 1 year old female presenting for evaluation of   Chief Complaint   Patient presents with     Derm Problem     Rash on the left side of her abdomen x 2 days.       Patient has been at her father's house for the last 2 days. When she was picked up from  today by her mother, her mother noticed a rash on her left abdomen.   provider did state that father knew about the rash but did not provide further explanation of it.  It looks like a blister with surrounding red bumps, per mom. Per  reports, it seems to be getting bigger. It seemed like it bothers her when the water hits it. No fever. She otherwise has been normal. She is eating well. No other rashes, other than a rope burn on the side of her neck, which mom knows she sustained on a swing.  Mom applied triple antibiotic on it today.    ROS  See HPI    PMH:  Past Medical History:   Diagnosis Date     Single live birth 2017     Patient Active Problem List    Diagnosis Date Noted     Difficulty passing stool 09/06/2016     Priority: Medium     Bronchiolitis 05/27/2016     Priority: Medium     Single live birth 2017     Priority: Medium         Current medications:  Current Outpatient Prescriptions   Medication Sig Dispense Refill     mupirocin (BACTROBAN) 2 % cream Apply topically 3 times daily 30 g 0     mupirocin (BACTROBAN) 2 % cream Apply topically 3 times daily 15 g 0       Surgical History:  No past surgical history on file.    Family history:  No family history on file.      Social History:  : yes    OBJECTIVE  Pulse 124  Temp 97.1  F (36.2  C) (Tympanic)  Wt 31 lb 6.4 oz (14.2 kg)  SpO2 98%    Physical Exam  General: alert, appears happy, NAD. Afebrile. Talkative.  Skin: on the right side of the neck there are two linear 3cm long abrasions consistent with rope burn.  On the left side of the abdomen, there is a 4cm oval-shaped unroofed blister. There is no weeping, crusting, or drainage.  There are 3 <1cm erythematous papules scattered about surrounding the blister.   HEENT: Normocephalic.   Eyes: conjunctiva clear.   Ears: TMs pearly, translucent bilaterally.  Oropharynx: MMM. No posterior pharyngeal erythema, petechiae, or exudate. Uvula midline.    Neck: supple, no lymphadenopathy.  Respiratory: No distress..   Neurologic: age-appropriate behavior.        ASSESSMENT/PLAN:      ICD-10-CM    1. Rash and nonspecific skin eruption R21 mupirocin (BACTROBAN) 2 % cream     mupirocin (BACTROBAN) 2 % cream        Medical Decision Making:    The blister may have been due to skin tension/rubbing or due to thermal burn.. History is unclear. Mom will follow up with father on history. I do not think reports to police or child protection are necessary at this point.  Mupirocin prescribed for antibacterial coverage in the case that this was an erupted bullous impetigo, though I have less suspicion for this.    Discussed with mom that if not improving in 5 days, she should follow up with her doctor, sooner if worsening.    Serious Comorbid Conditions: none    Return precautions provided below in patient instructions.  Patient understood and agreed to plan. Patient was appropriate for discharge.      Patient Instructions   Apply Bactroban cream three times daily. Cover loosely.  Continue to monitor closely. If no improvement by Monday, follow up with her doctor.  If worsening, follow up sooner.            Michaela Woo PA-C  06/28/18 6:33 PM       41 year old   male with at least 2 previous psychiatric admissions (last at [facility] in 8/2018) where he was diagnosed with schizophreniform disorder , has been non complaint with treatment, with a past medical history significant for HTN, no history of violence, no legal issues, lives with 18 year old daughter who presented to the [place] ed for complaints of generalized body aches and  headaches.  While in the waiting area, he was noticed to be acting bizarrely, making statements about the “holy spirit”.  When psychiatry assessed the patient and “asked what that was about, patient reports the “holy spirit talks to him, and is here with him, kneel down and pray.   He is vague about how they communicate to him, but patient report they speak to him”     PPHX: two  previous psych admissions in 2018 and denies any since then.  He also attended the partial program at [facility] upon discharge from [facility].  On previous admission to [facility] patient was floridly psychotic, with disorganization of thought/speech, hyperreligious with Messiah delusion about himself. He described stressors of mother being diagnosed with breast cancer. Pt also talked about being "betrayed" by his wife and daughter and how they did not "respect him," and how he wanted to leave his family.   Hospital records were obtained from  [facility] hospitalization 7/22/2018-7/27/2018 indicated very similar presentation with mix of psychotic and manic-like symptoms, but was eventually diagnosed with brief psychotic episode and stabilized on risperidone. During his hospitalization at [facility], patient had gradual attenuation of his delusions and disorganiation, while he still spoke of wanting to spread peace and love, he became more rational about this.    He still had some odd behaviors, would be cleaning frequently in dining area (was thought to be possible [employer]) and was also constantly bowing and shaking hands with staff (this was just exaggerated cultural behavior - known as "insah" in Lithuanian). Pt would also make provocative romantic statements to select staff, but this did not emerge until pt was near his baseline, in absence of any other overt psychotic or manic symptoms, and he responded to redirection and ceased this behavior.   He was discharged on Risperdal 4mg daily     PMHX: HTN and HX of liver failure and coma     FHX: None known     SH: Lives with 17 yo daughter. Previously employed as . No legal history, no abuse history, no            41 year old   male with at least 2 previous psychiatric admissions (last at [facility] in 8/2018) where he was diagnosed with schizophreniform disorder , has been non complaint with treatment, with a past medical history significant for HTN, no history of violence, no legal issues, lives with 18 year old daughter who presented to the [place] ed for complaints of generalized body aches and  headaches.  While in the waiting area, he was noticed to be acting bizarrely, making statements about the “holy spirit”.  When psychiatry assessed the patient and “asked what that was about, patient reports the “holy spirit talks to him, and is here with him, kneel down and pray.   He is vague about how they communicate to him, but patient report they speak to him”    On interview today, patient reports that he took a day off from work so that he could ponder a "big decision" of whether he should attend a [employer] school.  He states he drove all the way to [place] to apologize to a fisherman after a recent argument.  When he got there, he felt paranoid towards the fisherman believing that they were talking about him.     He states that this hurt his hurt and that is why he went to the emergency room.  He states he is a very Yarsanism man.  Has been praying for his sick mother who was diagnosed with cancer 4 years ago.   When asked whether he sees god, hears his voice, or has a special relationship with god he could not answer.    he reports being in a good mood.  he reports 3-5hrs per sleep.  reports fair appetite. he denies si/hi with no I/i/p.        PPHX: two  previous psych admissions in 2018 and denies any since then.  He also attended the partial program at [facility] upon discharge from [facility].  On previous admission to [facility] patient was floridly psychotic, with disorganization of thought/speech, hyperreligious with Messiah delusion about himself. He described stressors of mother being diagnosed with breast cancer. Pt also talked about being "betrayed" by his wife and daughter and how they did not "respect him," and how he wanted to leave his family.   Hospital records were obtained from  [facility] hospitalization 7/22/2018-7/27/2018 indicated very similar presentation with mix of psychotic and manic-like symptoms, but was eventually diagnosed with brief psychotic episode and stabilized on risperidone. During his hospitalization at [facility], patient had gradual attenuation of his delusions and disorganiation, while he still spoke of wanting to spread peace and love, he became more rational about this.    He still had some odd behaviors, would be cleaning frequently in dining area (was thought to be possible [employer]) and was also constantly bowing and shaking hands with staff (this was just exaggerated cultural behavior - known as "insah" in Sinhala). Pt would also make provocative romantic statements to select staff, but this did not emerge until pt was near his baseline, in absence of any other overt psychotic or manic symptoms, and he responded to redirection and ceased this behavior.   He was discharged on Risperdal 4mg daily     PMHX: HTN and HX of liver failure and coma     FHX: None known     SH: Lives with 17 yo daughter and wife. Previously employed as , but states he now works as a . No legal history, no abuse history, no

## 2021-07-10 LAB
COVID-19 SPIKE DOMAIN AB INTERP: POSITIVE
COVID-19 SPIKE DOMAIN ANTIBODY RESULT: 18.5 U/ML — HIGH
SARS-COV-2 IGG+IGM SERPL QL IA: 18.5 U/ML — HIGH
SARS-COV-2 IGG+IGM SERPL QL IA: POSITIVE

## 2021-07-10 PROCEDURE — 99232 SBSQ HOSP IP/OBS MODERATE 35: CPT

## 2021-07-10 PROCEDURE — 99222 1ST HOSP IP/OBS MODERATE 55: CPT

## 2021-07-10 RX ORDER — NICOTINE POLACRILEX 2 MG
1 GUM BUCCAL DAILY
Refills: 0 | Status: DISCONTINUED | OUTPATIENT
Start: 2021-07-10 | End: 2021-07-20

## 2021-07-10 RX ORDER — ATENOLOL 25 MG/1
100 TABLET ORAL ONCE
Refills: 0 | Status: COMPLETED | OUTPATIENT
Start: 2021-07-10 | End: 2021-07-10

## 2021-07-10 RX ORDER — ACETAMINOPHEN 500 MG
650 TABLET ORAL ONCE
Refills: 0 | Status: COMPLETED | OUTPATIENT
Start: 2021-07-10 | End: 2021-07-10

## 2021-07-10 RX ORDER — ATENOLOL 25 MG/1
50 TABLET ORAL DAILY
Refills: 0 | Status: DISCONTINUED | OUTPATIENT
Start: 2021-07-11 | End: 2021-07-20

## 2021-07-10 RX ORDER — NICOTINE POLACRILEX 2 MG
2 GUM BUCCAL
Refills: 0 | Status: DISCONTINUED | OUTPATIENT
Start: 2021-07-10 | End: 2021-07-20

## 2021-07-10 RX ADMIN — Medication 2 MILLIGRAM(S): at 20:58

## 2021-07-10 RX ADMIN — Medication 1 PATCH: at 11:09

## 2021-07-10 RX ADMIN — RISPERIDONE 1 MILLIGRAM(S): 4 TABLET ORAL at 20:58

## 2021-07-10 RX ADMIN — ATENOLOL 100 MILLIGRAM(S): 25 TABLET ORAL at 19:47

## 2021-07-10 RX ADMIN — Medication 50 MILLIGRAM(S): at 20:58

## 2021-07-10 RX ADMIN — Medication 2 MILLIGRAM(S): at 23:59

## 2021-07-10 RX ADMIN — HALOPERIDOL DECANOATE 5 MILLIGRAM(S): 100 INJECTION INTRAMUSCULAR at 20:59

## 2021-07-10 RX ADMIN — Medication 2 MILLIGRAM(S): at 02:27

## 2021-07-10 RX ADMIN — Medication 650 MILLIGRAM(S): at 02:27

## 2021-07-10 NOTE — CONSULT NOTE ADULT - ASSESSMENT
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  1.  HTN:  PT WITH HX OF HTN BUT OFF MEDS AND BP STABLE.  FOLLOW VS.  2.  ROUTINE BLOOD WORK  3.  CHECK EKG  4.  COVID SPIKE ANTIBODY POSITIVE  5.  PSYCH: AS PER ATTENDING 41 year old male with exacerbation of Schizophreniform Disorder with Paranoia and Anglican preoccupation.  1.  HTN:  PT WITH HX OF HTN BUT OFF MEDS AND BP STABLE.  PT STATES TAKES ATENOLOL 100 MG QD.  WILL RESTART TOMORROW AT 50 MG QD. FOLLOW VS.  2.  ROUTINE BLOOD WORK  3.  CHECK EKG  4.  COVID SPIKE ANTIBODY POSITIVE  5.  PSYCH: AS PER ATTENDING

## 2021-07-10 NOTE — PSYCHIATRIC REHAB INITIAL EVALUATION - NSBHPRRECOMMEND_PSY_ALL_CORE
Patient is a 41 year old,   male, hospitalized at [facility] on 7/9/2021, with a hx of inpatient hospitalizations, no known of violence, suicide attempts, or substance abuse. Patient was reported to irritable, psychotic, religiously preoccuopied, and minimally cooperative with staff. Patient and writer were unable to establish a collaborative rehabilitation goals, therefore an appropriate goal was chosen for patient. Psychiatric rehabilitation staff will continue to provide ongoing support and encouragement.  Writer met with patient in order to orient him to the unit, introduce him to department staff and department functions, and provide him with a copy of the unit schedule. Patient was cooperative, however patient denied all symptoms and reported that he was hospitalized in order to "take a break from work". Patient denied SI/HI/AH/VH, and reported feeling "great". Patient and writer were able to establish a collaborative rehabilitation goal.  Psychiatric rehabilitation staff will continue to provide ongoing support and encouragement.

## 2021-07-10 NOTE — PSYCHIATRIC REHAB INITIAL EVALUATION - VISION (WITH CORRECTIVE LENSES IF THE PATIENT USUALLY WEARS THEM):
Normal vision: sees adequately in most situations; can see medication labels, newsprint
PAIN/+swelling

## 2021-07-10 NOTE — CONSULT NOTE ADULT - SUBJECTIVE AND OBJECTIVE BOX
HPI:   41 year old male with exacerbation of Schizophreniform Disorder with Paranoia and Holiness preoccupation.    PAST MEDICAL & SURGICAL HISTORY:  HTN (hypertension)        Review of Systems:   CONSTITUTIONAL: No fever, weight loss, or fatigue  EYES: No eye pain, visual disturbances, or discharge  ENMT:  No difficulty hearing, tinnitus, vertigo; No sinus or throat pain  NECK: No pain or stiffness  RESPIRATORY: No cough, wheezing, chills or hemoptysis; No shortness of breath  CARDIOVASCULAR: No chest pain, palpitations, dizziness, or leg swelling  GASTROINTESTINAL: No abdominal or epigastric pain. No nausea, vomiting, or hematemesis; No diarrhea or constipation. No melena or hematochezia.  GENITOURINARY: No dysuria, frequency, hematuria, or incontinence  NEUROLOGICAL: No headaches, memory loss, loss of strength, numbness, or tremors  SKIN: No itching, burning, rashes, or lesions   LYMPH NODES: No enlarged glands  ENDOCRINE: No heat or cold intolerance; No hair loss  MUSCULOSKELETAL: No joint pain or swelling; No muscle, back, or extremity pain  HEME/LYMPH: No easy bruising, or bleeding gums  ALLERY AND IMMUNOLOGIC: No hives or eczema    Allergies    No Known Allergies    Social History:   +CIGS, -EOTH, -DRUGS    FAMILY HISTORY:  NO SIGNIFICANT MEDICAL HX IN FIRST DEGREE RELATIVES.    MEDICATIONS  (STANDING):  nicotine - 21 mG/24Hr(s) Patch 1 patch Transdermal daily  risperiDONE   Tablet 1 milliGRAM(s) Oral at bedtime    MEDICATIONS  (PRN):  diphenhydrAMINE 50 milliGRAM(s) Oral every 6 hours PRN eps  diphenhydrAMINE   Injectable 50 milliGRAM(s) IntraMuscular once PRN eps  haloperidol     Tablet 5 milliGRAM(s) Oral every 6 hours PRN psychosis  haloperidol    Injectable 5 milliGRAM(s) IntraMuscular once PRN severe psychosis  LORazepam     Tablet 2 milliGRAM(s) Oral every 6 hours PRN anxiety  LORazepam   Injectable 2 milliGRAM(s) IntraMuscular once PRN severe anxiety  nicotine  Polacrilex Gum 2 milliGRAM(s) Oral every 2 hours PRN nicotine cravings    VS:  T 97.4  SITTING 122/88 79  STANDING 124/90 93      PHYSICAL EXAM:  GENERAL: NAD, well-developed  HEAD:  Atraumatic, Normocephalic  EYES: EOMI, conjunctiva and sclera clear  NECK: Supple, No JVD  CHEST/LUNG: Clear to auscultation bilaterally; No wheeze  HEART: Regular rate and rhythm; No murmurs, rubs, or gallops  ABDOMEN: Soft, Nontender, Nondistended; Bowel sounds present  EXTREMITIES:  No clubbing, cyanosis, or edema  NEUROLOGY: non-focal  SKIN: No rashes or lesions    LABS:  07/10/2021  COVID SPIKE ANTIBODY POSITIVE    OTHER LABS PENDING    EKG:  PENDING        Consultant(s) Notes Reviewed:  NOTES REVIEWED    Care Discussed with Consultants/Other Providers:  ATTENDING AND STAFF   HPI:   41 year old male with exacerbation of Schizophreniform Disorder with Paranoia and Orthodoxy preoccupation.    PT STATES HAS HTN ON ATENOLOL 100 MG QD.  ATENOLOL NOT ORDERED TODAY AND VS WNL.    PAST MEDICAL & SURGICAL HISTORY:  HTN (hypertension)        Review of Systems:   CONSTITUTIONAL: No fever, weight loss, or fatigue  EYES: No eye pain, visual disturbances, or discharge  ENMT:  No difficulty hearing, tinnitus, vertigo; No sinus or throat pain  NECK: No pain or stiffness  RESPIRATORY: No cough, wheezing, chills or hemoptysis; No shortness of breath  CARDIOVASCULAR: No chest pain, palpitations, dizziness, or leg swelling  GASTROINTESTINAL: No abdominal or epigastric pain. No nausea, vomiting, or hematemesis; No diarrhea or constipation. No melena or hematochezia.  GENITOURINARY: No dysuria, frequency, hematuria, or incontinence  NEUROLOGICAL: No headaches, memory loss, loss of strength, numbness, or tremors  SKIN: No itching, burning, rashes, or lesions   LYMPH NODES: No enlarged glands  ENDOCRINE: No heat or cold intolerance; No hair loss  MUSCULOSKELETAL: No joint pain or swelling; No muscle, back, or extremity pain  HEME/LYMPH: No easy bruising, or bleeding gums  ALLERY AND IMMUNOLOGIC: No hives or eczema    Allergies    No Known Allergies    Social History:   +CIGS, -EOTH, -DRUGS    FAMILY HISTORY:  NO SIGNIFICANT MEDICAL HX IN FIRST DEGREE RELATIVES.    MEDICATIONS  (STANDING):  nicotine - 21 mG/24Hr(s) Patch 1 patch Transdermal daily  risperiDONE   Tablet 1 milliGRAM(s) Oral at bedtime    MEDICATIONS  (PRN):  diphenhydrAMINE 50 milliGRAM(s) Oral every 6 hours PRN eps  diphenhydrAMINE   Injectable 50 milliGRAM(s) IntraMuscular once PRN eps  haloperidol     Tablet 5 milliGRAM(s) Oral every 6 hours PRN psychosis  haloperidol    Injectable 5 milliGRAM(s) IntraMuscular once PRN severe psychosis  LORazepam     Tablet 2 milliGRAM(s) Oral every 6 hours PRN anxiety  LORazepam   Injectable 2 milliGRAM(s) IntraMuscular once PRN severe anxiety  nicotine  Polacrilex Gum 2 milliGRAM(s) Oral every 2 hours PRN nicotine cravings    VS:  T 97.4  SITTING 122/88 79  STANDING 124/90 93  OXYGEN SAT 96%      PHYSICAL EXAM:  GENERAL: NAD, well-developed  HEAD:  Atraumatic, Normocephalic  EYES: EOMI, conjunctiva and sclera clear  NECK: Supple, No JVD  CHEST/LUNG: Clear to auscultation bilaterally; No wheeze  HEART: Regular rate and rhythm; No murmurs, rubs, or gallops  ABDOMEN: Soft, Nontender, Nondistended; Bowel sounds present  EXTREMITIES:  No clubbing, cyanosis, or edema  NEUROLOGY: non-focal  SKIN: No rashes or lesions    LABS:  07/10/2021  COVID SPIKE ANTIBODY POSITIVE    OTHER LABS PENDING    EKG:  PENDING        Consultant(s) Notes Reviewed:  NOTES REVIEWED    Care Discussed with Consultants/Other Providers:  ATTENDING AND STAFF

## 2021-07-10 NOTE — CONSULT NOTE ADULT - CONSULT REASON
Asked by attending to see this 41 year old patient with a hx of Schizophreniform Disorder now with exacerbation with paranoia and Islam preoccupation.

## 2021-07-11 PROCEDURE — 99232 SBSQ HOSP IP/OBS MODERATE 35: CPT

## 2021-07-11 RX ORDER — RISPERIDONE 4 MG/1
2 TABLET ORAL AT BEDTIME
Refills: 0 | Status: DISCONTINUED | OUTPATIENT
Start: 2021-07-11 | End: 2021-07-12

## 2021-07-11 RX ADMIN — Medication 1 PATCH: at 08:28

## 2021-07-11 RX ADMIN — HALOPERIDOL DECANOATE 5 MILLIGRAM(S): 100 INJECTION INTRAMUSCULAR at 21:27

## 2021-07-11 RX ADMIN — Medication 1 PATCH: at 18:03

## 2021-07-11 RX ADMIN — Medication 2 MILLIGRAM(S): at 21:27

## 2021-07-11 RX ADMIN — ATENOLOL 50 MILLIGRAM(S): 25 TABLET ORAL at 08:28

## 2021-07-11 RX ADMIN — Medication 2 MILLIGRAM(S): at 17:22

## 2021-07-11 RX ADMIN — Medication 2 MILLIGRAM(S): at 20:19

## 2021-07-11 RX ADMIN — Medication 2 MILLIGRAM(S): at 05:56

## 2021-07-11 RX ADMIN — Medication 50 MILLIGRAM(S): at 21:27

## 2021-07-11 RX ADMIN — Medication 2 MILLIGRAM(S): at 08:30

## 2021-07-11 RX ADMIN — Medication 2 MILLIGRAM(S): at 14:19

## 2021-07-11 RX ADMIN — RISPERIDONE 2 MILLIGRAM(S): 4 TABLET ORAL at 21:26

## 2021-07-12 PROCEDURE — 99232 SBSQ HOSP IP/OBS MODERATE 35: CPT

## 2021-07-12 RX ORDER — TRAZODONE HCL 50 MG
50 TABLET ORAL AT BEDTIME
Refills: 0 | Status: DISCONTINUED | OUTPATIENT
Start: 2021-07-12 | End: 2021-07-16

## 2021-07-12 RX ORDER — ACETAMINOPHEN 500 MG
650 TABLET ORAL EVERY 6 HOURS
Refills: 0 | Status: DISCONTINUED | OUTPATIENT
Start: 2021-07-12 | End: 2021-07-14

## 2021-07-12 RX ORDER — ACETAMINOPHEN 500 MG
650 TABLET ORAL ONCE
Refills: 0 | Status: COMPLETED | OUTPATIENT
Start: 2021-07-12 | End: 2021-07-12

## 2021-07-12 RX ORDER — LANOLIN ALCOHOL/MO/W.PET/CERES
5 CREAM (GRAM) TOPICAL AT BEDTIME
Refills: 0 | Status: DISCONTINUED | OUTPATIENT
Start: 2021-07-12 | End: 2021-07-20

## 2021-07-12 RX ORDER — RISPERIDONE 4 MG/1
3 TABLET ORAL AT BEDTIME
Refills: 0 | Status: DISCONTINUED | OUTPATIENT
Start: 2021-07-12 | End: 2021-07-13

## 2021-07-12 RX ADMIN — RISPERIDONE 3 MILLIGRAM(S): 4 TABLET ORAL at 21:35

## 2021-07-12 RX ADMIN — Medication 2 MILLIGRAM(S): at 16:00

## 2021-07-12 RX ADMIN — Medication 650 MILLIGRAM(S): at 21:34

## 2021-07-12 RX ADMIN — Medication 1 PATCH: at 19:48

## 2021-07-12 RX ADMIN — Medication 2 MILLIGRAM(S): at 05:38

## 2021-07-12 RX ADMIN — Medication 2 MILLIGRAM(S): at 21:34

## 2021-07-12 RX ADMIN — Medication 5 MILLIGRAM(S): at 21:34

## 2021-07-12 RX ADMIN — HALOPERIDOL DECANOATE 5 MILLIGRAM(S): 100 INJECTION INTRAMUSCULAR at 21:34

## 2021-07-12 RX ADMIN — ATENOLOL 50 MILLIGRAM(S): 25 TABLET ORAL at 08:38

## 2021-07-12 RX ADMIN — Medication 650 MILLIGRAM(S): at 06:07

## 2021-07-12 RX ADMIN — Medication 2 MILLIGRAM(S): at 20:13

## 2021-07-12 RX ADMIN — Medication 50 MILLIGRAM(S): at 21:35

## 2021-07-12 RX ADMIN — Medication 650 MILLIGRAM(S): at 20:12

## 2021-07-12 RX ADMIN — Medication 2 MILLIGRAM(S): at 12:10

## 2021-07-12 RX ADMIN — Medication 2 MILLIGRAM(S): at 08:39

## 2021-07-12 RX ADMIN — Medication 1 PATCH: at 08:38

## 2021-07-12 NOTE — BH SOCIAL WORK INITIAL PSYCHOSOCIAL EVALUATION - OTHER PAST PSYCHIATRIC HISTORY (INCLUDE DETAILS REGARDING ONSET, COURSE OF ILLNESS, INPATIENT/OUTPATIENT TREATMENT)
Pt is a 41 year old   male with 2 previous hospitalizations (last at [facility] in 8/2018), diagnosed with schizophreniform disorder, has been non complaint with treatment, past medical history HTN, no history of violence, no legal issues, lives with 18 year old daughter who presented to the [place] ED for complaints of generalized body aches and  headaches. While in the waiting area, he was noticed to be acting bizarre, making statements about the “holy spirit”.  Pt assessed by psychiatry and reported  “holy spirit talks to him, and is here with him, kneel down and pray. He is vague about how they communicate to him, but patient report they speak to him”. Pt admitted to [facility] L4 for delusional thinking/thought disorder.  Pt is a 41 year old, ,  male, domiciled with 18 year old daughter and wife, pphx 2 previous hospitalizations (last at [facility] in 8/2018), diagnosed with schizophreniform disorder, has been non complaint with treatment, past medical history HTN, no history of violence, no legal issues, presented to the [place] ED for complaints of generalized body aches and  headaches. While in the waiting area, he was noticed to be acting bizarre, making statements about the “holy spirit”.  Pt assessed by psychiatry and admitted to [facility] L4 for delusional thinking/thought disorder.

## 2021-07-12 NOTE — BH SOCIAL WORK INITIAL PSYCHOSOCIAL EVALUATION - NSCMSPTSTRENGTHS_PSY_ALL_CORE
Able to adapt/[place]/spirituality/Future/goal oriented/Physically healthy/Resourceful/Supportive family

## 2021-07-13 LAB
A1C WITH ESTIMATED AVERAGE GLUCOSE RESULT: 5.3 % — SIGNIFICANT CHANGE UP (ref 4–5.6)
ALBUMIN SERPL ELPH-MCNC: 4.3 G/DL — SIGNIFICANT CHANGE UP (ref 3.3–5)
ALP SERPL-CCNC: 46 U/L — SIGNIFICANT CHANGE UP (ref 40–120)
ALT FLD-CCNC: 33 U/L — SIGNIFICANT CHANGE UP (ref 4–41)
ANION GAP SERPL CALC-SCNC: 15 MMOL/L — HIGH (ref 7–14)
AST SERPL-CCNC: 33 U/L — SIGNIFICANT CHANGE UP (ref 4–40)
BASOPHILS # BLD AUTO: 0.04 K/UL — SIGNIFICANT CHANGE UP (ref 0–0.2)
BASOPHILS NFR BLD AUTO: 0.6 % — SIGNIFICANT CHANGE UP (ref 0–2)
BILIRUB SERPL-MCNC: 0.4 MG/DL — SIGNIFICANT CHANGE UP (ref 0.2–1.2)
BUN SERPL-MCNC: 18 MG/DL — SIGNIFICANT CHANGE UP (ref 7–23)
CALCIUM SERPL-MCNC: 9.7 MG/DL — SIGNIFICANT CHANGE UP (ref 8.4–10.5)
CHLORIDE SERPL-SCNC: 101 MMOL/L — SIGNIFICANT CHANGE UP (ref 98–107)
CHOLEST SERPL-MCNC: 166 MG/DL — SIGNIFICANT CHANGE UP
CO2 SERPL-SCNC: 22 MMOL/L — SIGNIFICANT CHANGE UP (ref 22–31)
CREAT SERPL-MCNC: 0.97 MG/DL — SIGNIFICANT CHANGE UP (ref 0.5–1.3)
EOSINOPHIL # BLD AUTO: 0.26 K/UL — SIGNIFICANT CHANGE UP (ref 0–0.5)
EOSINOPHIL NFR BLD AUTO: 4.1 % — SIGNIFICANT CHANGE UP (ref 0–6)
ESTIMATED AVERAGE GLUCOSE: 105 — SIGNIFICANT CHANGE UP
GLUCOSE SERPL-MCNC: 116 MG/DL — HIGH (ref 70–99)
HCT VFR BLD CALC: 43.8 % — SIGNIFICANT CHANGE UP (ref 39–50)
HDLC SERPL-MCNC: 35 MG/DL — LOW
HGB BLD-MCNC: 14.8 G/DL — SIGNIFICANT CHANGE UP (ref 13–17)
IANC: 4.12 K/UL — SIGNIFICANT CHANGE UP (ref 1.5–8.5)
IMM GRANULOCYTES NFR BLD AUTO: 0.2 % — SIGNIFICANT CHANGE UP (ref 0–1.5)
LIPID PNL WITH DIRECT LDL SERPL: 74 MG/DL — SIGNIFICANT CHANGE UP
LYMPHOCYTES # BLD AUTO: 1.45 K/UL — SIGNIFICANT CHANGE UP (ref 1–3.3)
LYMPHOCYTES # BLD AUTO: 23.1 % — SIGNIFICANT CHANGE UP (ref 13–44)
MCHC RBC-ENTMCNC: 31 PG — SIGNIFICANT CHANGE UP (ref 27–34)
MCHC RBC-ENTMCNC: 33.8 GM/DL — SIGNIFICANT CHANGE UP (ref 32–36)
MCV RBC AUTO: 91.6 FL — SIGNIFICANT CHANGE UP (ref 80–100)
MONOCYTES # BLD AUTO: 0.41 K/UL — SIGNIFICANT CHANGE UP (ref 0–0.9)
MONOCYTES NFR BLD AUTO: 6.5 % — SIGNIFICANT CHANGE UP (ref 2–14)
NEUTROPHILS # BLD AUTO: 4.12 K/UL — SIGNIFICANT CHANGE UP (ref 1.8–7.4)
NEUTROPHILS NFR BLD AUTO: 65.5 % — SIGNIFICANT CHANGE UP (ref 43–77)
NON HDL CHOLESTEROL: 131 MG/DL — HIGH
NRBC # BLD: 0 /100 WBCS — SIGNIFICANT CHANGE UP
NRBC # FLD: 0 K/UL — SIGNIFICANT CHANGE UP
PLATELET # BLD AUTO: 250 K/UL — SIGNIFICANT CHANGE UP (ref 150–400)
POTASSIUM SERPL-MCNC: 3.9 MMOL/L — SIGNIFICANT CHANGE UP (ref 3.5–5.3)
POTASSIUM SERPL-SCNC: 3.9 MMOL/L — SIGNIFICANT CHANGE UP (ref 3.5–5.3)
PROT SERPL-MCNC: 7.2 G/DL — SIGNIFICANT CHANGE UP (ref 6–8.3)
RBC # BLD: 4.78 M/UL — SIGNIFICANT CHANGE UP (ref 4.2–5.8)
RBC # FLD: 12.7 % — SIGNIFICANT CHANGE UP (ref 10.3–14.5)
SODIUM SERPL-SCNC: 138 MMOL/L — SIGNIFICANT CHANGE UP (ref 135–145)
TRIGL SERPL-MCNC: 285 MG/DL — HIGH
TSH SERPL-MCNC: 0.35 UIU/ML — SIGNIFICANT CHANGE UP (ref 0.27–4.2)
WBC # BLD: 6.29 K/UL — SIGNIFICANT CHANGE UP (ref 3.8–10.5)
WBC # FLD AUTO: 6.29 K/UL — SIGNIFICANT CHANGE UP (ref 3.8–10.5)

## 2021-07-13 PROCEDURE — 93010 ELECTROCARDIOGRAM REPORT: CPT

## 2021-07-13 PROCEDURE — 99232 SBSQ HOSP IP/OBS MODERATE 35: CPT

## 2021-07-13 RX ORDER — RISPERIDONE 4 MG/1
4 TABLET ORAL AT BEDTIME
Refills: 0 | Status: DISCONTINUED | OUTPATIENT
Start: 2021-07-13 | End: 2021-07-20

## 2021-07-13 RX ORDER — LITHIUM CARBONATE 300 MG/1
300 TABLET, EXTENDED RELEASE ORAL
Refills: 0 | Status: DISCONTINUED | OUTPATIENT
Start: 2021-07-13 | End: 2021-07-14

## 2021-07-13 RX ADMIN — LITHIUM CARBONATE 300 MILLIGRAM(S): 300 TABLET, EXTENDED RELEASE ORAL at 21:51

## 2021-07-13 RX ADMIN — Medication 650 MILLIGRAM(S): at 07:39

## 2021-07-13 RX ADMIN — Medication 50 MILLIGRAM(S): at 21:52

## 2021-07-13 RX ADMIN — Medication 2 MILLIGRAM(S): at 08:34

## 2021-07-13 RX ADMIN — Medication 5 MILLIGRAM(S): at 21:51

## 2021-07-13 RX ADMIN — RISPERIDONE 4 MILLIGRAM(S): 4 TABLET ORAL at 21:50

## 2021-07-13 RX ADMIN — Medication 2 MILLIGRAM(S): at 04:16

## 2021-07-13 RX ADMIN — Medication 2 MILLIGRAM(S): at 22:01

## 2021-07-13 RX ADMIN — Medication 50 MILLIGRAM(S): at 22:51

## 2021-07-13 RX ADMIN — Medication 1 PATCH: at 08:33

## 2021-07-13 RX ADMIN — Medication 2 MILLIGRAM(S): at 16:43

## 2021-07-13 RX ADMIN — ATENOLOL 50 MILLIGRAM(S): 25 TABLET ORAL at 08:32

## 2021-07-13 RX ADMIN — Medication 2 MILLIGRAM(S): at 21:57

## 2021-07-13 RX ADMIN — Medication 650 MILLIGRAM(S): at 15:30

## 2021-07-13 RX ADMIN — HALOPERIDOL DECANOATE 5 MILLIGRAM(S): 100 INJECTION INTRAMUSCULAR at 21:52

## 2021-07-13 RX ADMIN — Medication 2 MILLIGRAM(S): at 11:41

## 2021-07-14 PROCEDURE — 99232 SBSQ HOSP IP/OBS MODERATE 35: CPT

## 2021-07-14 RX ORDER — ACETAMINOPHEN 500 MG
650 TABLET ORAL EVERY 6 HOURS
Refills: 0 | Status: DISCONTINUED | OUTPATIENT
Start: 2021-07-14 | End: 2021-07-14

## 2021-07-14 RX ORDER — LITHIUM CARBONATE 300 MG/1
300 TABLET, EXTENDED RELEASE ORAL DAILY
Refills: 0 | Status: DISCONTINUED | OUTPATIENT
Start: 2021-07-15 | End: 2021-07-20

## 2021-07-14 RX ORDER — IBUPROFEN 200 MG
400 TABLET ORAL EVERY 6 HOURS
Refills: 0 | Status: DISCONTINUED | OUTPATIENT
Start: 2021-07-14 | End: 2021-07-14

## 2021-07-14 RX ORDER — SULINDAC 200 MG/1
200 TABLET ORAL EVERY 12 HOURS
Refills: 0 | Status: DISCONTINUED | OUTPATIENT
Start: 2021-07-14 | End: 2021-07-20

## 2021-07-14 RX ORDER — POLYETHYLENE GLYCOL 3350 17 G/17G
17 POWDER, FOR SOLUTION ORAL DAILY
Refills: 0 | Status: DISCONTINUED | OUTPATIENT
Start: 2021-07-14 | End: 2021-07-20

## 2021-07-14 RX ORDER — LITHIUM CARBONATE 300 MG/1
600 TABLET, EXTENDED RELEASE ORAL AT BEDTIME
Refills: 0 | Status: DISCONTINUED | OUTPATIENT
Start: 2021-07-14 | End: 2021-07-16

## 2021-07-14 RX ORDER — IBUPROFEN 200 MG
600 TABLET ORAL EVERY 6 HOURS
Refills: 0 | Status: DISCONTINUED | OUTPATIENT
Start: 2021-07-14 | End: 2021-07-14

## 2021-07-14 RX ADMIN — Medication 2 MILLIGRAM(S): at 08:35

## 2021-07-14 RX ADMIN — Medication 5 MILLIGRAM(S): at 21:42

## 2021-07-14 RX ADMIN — Medication 1 PATCH: at 08:36

## 2021-07-14 RX ADMIN — Medication 2 MILLIGRAM(S): at 17:59

## 2021-07-14 RX ADMIN — Medication 50 MILLIGRAM(S): at 21:43

## 2021-07-14 RX ADMIN — Medication 2 MILLIGRAM(S): at 21:44

## 2021-07-14 RX ADMIN — Medication 2 MILLIGRAM(S): at 06:18

## 2021-07-14 RX ADMIN — Medication 650 MILLIGRAM(S): at 08:35

## 2021-07-14 RX ADMIN — Medication 2 MILLIGRAM(S): at 13:18

## 2021-07-14 RX ADMIN — LITHIUM CARBONATE 300 MILLIGRAM(S): 300 TABLET, EXTENDED RELEASE ORAL at 08:34

## 2021-07-14 RX ADMIN — ATENOLOL 50 MILLIGRAM(S): 25 TABLET ORAL at 08:35

## 2021-07-14 RX ADMIN — Medication 2 MILLIGRAM(S): at 10:58

## 2021-07-14 RX ADMIN — LITHIUM CARBONATE 600 MILLIGRAM(S): 300 TABLET, EXTENDED RELEASE ORAL at 21:43

## 2021-07-14 RX ADMIN — HALOPERIDOL DECANOATE 5 MILLIGRAM(S): 100 INJECTION INTRAMUSCULAR at 21:43

## 2021-07-14 RX ADMIN — RISPERIDONE 4 MILLIGRAM(S): 4 TABLET ORAL at 21:44

## 2021-07-14 NOTE — BH TREATMENT PLAN - NSTXMEDICINTERPR_PSY_ALL_CORE
During this hospitalization, patient willbe encouraged to attend daily groups and meet with staff individually in order to demonstrate daily compliance for improved symptom management within seven days.

## 2021-07-14 NOTE — BH TREATMENT PLAN - NSTXDCOPLKINTERSW_PSY_ALL_CORE
SW will provide support, insight, discharge planning, psycho-education, and maintain contact with identified supports.

## 2021-07-15 PROCEDURE — 99232 SBSQ HOSP IP/OBS MODERATE 35: CPT

## 2021-07-15 RX ORDER — FENOFIBRATE,MICRONIZED 130 MG
145 CAPSULE ORAL DAILY
Refills: 0 | Status: DISCONTINUED | OUTPATIENT
Start: 2021-07-15 | End: 2021-07-20

## 2021-07-15 RX ADMIN — Medication 50 MILLIGRAM(S): at 21:11

## 2021-07-15 RX ADMIN — Medication 5 MILLIGRAM(S): at 21:07

## 2021-07-15 RX ADMIN — Medication 50 MILLIGRAM(S): at 21:10

## 2021-07-15 RX ADMIN — Medication 2 MILLIGRAM(S): at 18:33

## 2021-07-15 RX ADMIN — Medication 2 MILLIGRAM(S): at 13:54

## 2021-07-15 RX ADMIN — RISPERIDONE 4 MILLIGRAM(S): 4 TABLET ORAL at 21:07

## 2021-07-15 RX ADMIN — Medication 2 MILLIGRAM(S): at 08:39

## 2021-07-15 RX ADMIN — HALOPERIDOL DECANOATE 5 MILLIGRAM(S): 100 INJECTION INTRAMUSCULAR at 21:11

## 2021-07-15 RX ADMIN — SULINDAC 200 MILLIGRAM(S): 200 TABLET ORAL at 18:33

## 2021-07-15 RX ADMIN — LITHIUM CARBONATE 300 MILLIGRAM(S): 300 TABLET, EXTENDED RELEASE ORAL at 08:39

## 2021-07-15 RX ADMIN — LITHIUM CARBONATE 600 MILLIGRAM(S): 300 TABLET, EXTENDED RELEASE ORAL at 21:07

## 2021-07-15 RX ADMIN — Medication 1 PATCH: at 08:38

## 2021-07-15 RX ADMIN — Medication 2 MILLIGRAM(S): at 21:11

## 2021-07-15 RX ADMIN — Medication 2 MILLIGRAM(S): at 21:07

## 2021-07-15 RX ADMIN — Medication 1 PATCH: at 08:13

## 2021-07-15 RX ADMIN — Medication 2 MILLIGRAM(S): at 16:30

## 2021-07-15 RX ADMIN — ATENOLOL 50 MILLIGRAM(S): 25 TABLET ORAL at 08:39

## 2021-07-15 NOTE — BH INPATIENT PSYCHIATRY PROGRESS NOTE - OTHER
October 14, 2020     Patient: Deanne Carranza   YOB: 1999   Date of Visit: 10/14/2020       To Whom it May Concern:    Deanne Carranza was seen in my clinic on 10/14/2020 at 1:00 pm.    Please excuse Deanne for her absence from work on the date listed above to be able to make her appointment.May return to work immediately with no restrictions.    Sincerely,         Torie Tenorio MD    Medical information is confidential and cannot be disclosed without the written consent of the patient or her representative.       Improving thought process. hyperverbal

## 2021-07-16 LAB — LITHIUM SERPL-MCNC: 0.5 MMOL/L — LOW (ref 0.6–1.2)

## 2021-07-16 PROCEDURE — 99232 SBSQ HOSP IP/OBS MODERATE 35: CPT | Mod: 25

## 2021-07-16 PROCEDURE — 90853 GROUP PSYCHOTHERAPY: CPT

## 2021-07-16 RX ORDER — CLONAZEPAM 1 MG
1 TABLET ORAL
Refills: 0 | Status: DISCONTINUED | OUTPATIENT
Start: 2021-07-16 | End: 2021-07-20

## 2021-07-16 RX ORDER — TRAZODONE HCL 50 MG
100 TABLET ORAL AT BEDTIME
Refills: 0 | Status: DISCONTINUED | OUTPATIENT
Start: 2021-07-16 | End: 2021-07-20

## 2021-07-16 RX ORDER — CLONAZEPAM 1 MG
1 TABLET ORAL AT BEDTIME
Refills: 0 | Status: DISCONTINUED | OUTPATIENT
Start: 2021-07-16 | End: 2021-07-16

## 2021-07-16 RX ORDER — LITHIUM CARBONATE 300 MG/1
900 TABLET, EXTENDED RELEASE ORAL AT BEDTIME
Refills: 0 | Status: DISCONTINUED | OUTPATIENT
Start: 2021-07-16 | End: 2021-07-20

## 2021-07-16 RX ADMIN — Medication 100 MILLIGRAM(S): at 21:16

## 2021-07-16 RX ADMIN — Medication 5 MILLIGRAM(S): at 21:15

## 2021-07-16 RX ADMIN — Medication 1 MILLIGRAM(S): at 21:15

## 2021-07-16 RX ADMIN — RISPERIDONE 4 MILLIGRAM(S): 4 TABLET ORAL at 21:15

## 2021-07-16 RX ADMIN — Medication 145 MILLIGRAM(S): at 08:30

## 2021-07-16 RX ADMIN — Medication 1 PATCH: at 08:30

## 2021-07-16 RX ADMIN — ATENOLOL 50 MILLIGRAM(S): 25 TABLET ORAL at 08:31

## 2021-07-16 RX ADMIN — LITHIUM CARBONATE 300 MILLIGRAM(S): 300 TABLET, EXTENDED RELEASE ORAL at 10:07

## 2021-07-16 RX ADMIN — Medication 2 MILLIGRAM(S): at 13:11

## 2021-07-16 RX ADMIN — SULINDAC 200 MILLIGRAM(S): 200 TABLET ORAL at 18:30

## 2021-07-16 RX ADMIN — Medication 2 MILLIGRAM(S): at 18:26

## 2021-07-16 RX ADMIN — Medication 2 MILLIGRAM(S): at 10:58

## 2021-07-16 RX ADMIN — LITHIUM CARBONATE 900 MILLIGRAM(S): 300 TABLET, EXTENDED RELEASE ORAL at 21:16

## 2021-07-16 RX ADMIN — Medication 2 MILLIGRAM(S): at 20:50

## 2021-07-17 RX ADMIN — LITHIUM CARBONATE 900 MILLIGRAM(S): 300 TABLET, EXTENDED RELEASE ORAL at 20:48

## 2021-07-17 RX ADMIN — Medication 100 MILLIGRAM(S): at 20:48

## 2021-07-17 RX ADMIN — Medication 145 MILLIGRAM(S): at 09:14

## 2021-07-17 RX ADMIN — LITHIUM CARBONATE 300 MILLIGRAM(S): 300 TABLET, EXTENDED RELEASE ORAL at 09:14

## 2021-07-17 RX ADMIN — RISPERIDONE 4 MILLIGRAM(S): 4 TABLET ORAL at 21:06

## 2021-07-17 RX ADMIN — Medication 2 MILLIGRAM(S): at 13:35

## 2021-07-17 RX ADMIN — Medication 1 MILLIGRAM(S): at 09:15

## 2021-07-17 RX ADMIN — Medication 5 MILLIGRAM(S): at 20:47

## 2021-07-17 RX ADMIN — ATENOLOL 50 MILLIGRAM(S): 25 TABLET ORAL at 09:15

## 2021-07-17 RX ADMIN — Medication 2 MILLIGRAM(S): at 16:35

## 2021-07-17 RX ADMIN — Medication 2 MILLIGRAM(S): at 18:38

## 2021-07-17 RX ADMIN — Medication 2 MILLIGRAM(S): at 07:46

## 2021-07-17 RX ADMIN — Medication 1 PATCH: at 09:14

## 2021-07-17 RX ADMIN — Medication 1 MILLIGRAM(S): at 20:47

## 2021-07-18 RX ADMIN — Medication 145 MILLIGRAM(S): at 09:03

## 2021-07-18 RX ADMIN — Medication 2 MILLIGRAM(S): at 14:22

## 2021-07-18 RX ADMIN — Medication 2 MILLIGRAM(S): at 11:15

## 2021-07-18 RX ADMIN — LITHIUM CARBONATE 900 MILLIGRAM(S): 300 TABLET, EXTENDED RELEASE ORAL at 20:19

## 2021-07-18 RX ADMIN — Medication 1 MILLIGRAM(S): at 20:19

## 2021-07-18 RX ADMIN — Medication 100 MILLIGRAM(S): at 20:19

## 2021-07-18 RX ADMIN — SULINDAC 200 MILLIGRAM(S): 200 TABLET ORAL at 21:53

## 2021-07-18 RX ADMIN — ATENOLOL 50 MILLIGRAM(S): 25 TABLET ORAL at 09:04

## 2021-07-18 RX ADMIN — Medication 1 MILLIGRAM(S): at 09:04

## 2021-07-18 RX ADMIN — POLYETHYLENE GLYCOL 3350 17 GRAM(S): 17 POWDER, FOR SOLUTION ORAL at 14:55

## 2021-07-18 RX ADMIN — Medication 1 PATCH: at 09:03

## 2021-07-18 RX ADMIN — Medication 2 MILLIGRAM(S): at 20:14

## 2021-07-18 RX ADMIN — RISPERIDONE 4 MILLIGRAM(S): 4 TABLET ORAL at 20:18

## 2021-07-18 RX ADMIN — SULINDAC 200 MILLIGRAM(S): 200 TABLET ORAL at 20:13

## 2021-07-18 RX ADMIN — Medication 2 MILLIGRAM(S): at 16:58

## 2021-07-18 RX ADMIN — LITHIUM CARBONATE 300 MILLIGRAM(S): 300 TABLET, EXTENDED RELEASE ORAL at 09:04

## 2021-07-18 RX ADMIN — Medication 5 MILLIGRAM(S): at 20:19

## 2021-07-18 RX ADMIN — Medication 2 MILLIGRAM(S): at 07:40

## 2021-07-19 PROCEDURE — 99232 SBSQ HOSP IP/OBS MODERATE 35: CPT

## 2021-07-19 RX ORDER — ATENOLOL 25 MG/1
1 TABLET ORAL
Qty: 0 | Refills: 0 | DISCHARGE
Start: 2021-07-19

## 2021-07-19 RX ORDER — SULINDAC 200 MG/1
100 TABLET ORAL ONCE
Refills: 0 | Status: COMPLETED | OUTPATIENT
Start: 2021-07-19 | End: 2021-07-19

## 2021-07-19 RX ORDER — LITHIUM CARBONATE 300 MG/1
2 TABLET, EXTENDED RELEASE ORAL
Qty: 28 | Refills: 0
Start: 2021-07-19 | End: 2021-08-01

## 2021-07-19 RX ORDER — TRAZODONE HCL 50 MG
1 TABLET ORAL
Qty: 50 | Refills: 0
Start: 2021-07-19 | End: 2021-09-06

## 2021-07-19 RX ORDER — RISPERIDONE 4 MG/1
1 TABLET ORAL
Qty: 14 | Refills: 0
Start: 2021-07-19 | End: 2021-08-01

## 2021-07-19 RX ORDER — NICOTINE POLACRILEX 2 MG
1 GUM BUCCAL
Qty: 14 | Refills: 0
Start: 2021-07-19 | End: 2021-08-01

## 2021-07-19 RX ORDER — FENOFIBRATE,MICRONIZED 130 MG
1 CAPSULE ORAL
Qty: 0 | Refills: 0 | DISCHARGE
Start: 2021-07-19

## 2021-07-19 RX ORDER — CLONAZEPAM 1 MG
1 TABLET ORAL
Qty: 14 | Refills: 0
Start: 2021-07-19 | End: 2021-07-25

## 2021-07-19 RX ADMIN — Medication 2 MILLIGRAM(S): at 13:16

## 2021-07-19 RX ADMIN — Medication 1 MILLIGRAM(S): at 21:00

## 2021-07-19 RX ADMIN — Medication 1 PATCH: at 09:00

## 2021-07-19 RX ADMIN — Medication 145 MILLIGRAM(S): at 08:16

## 2021-07-19 RX ADMIN — SULINDAC 100 MILLIGRAM(S): 200 TABLET ORAL at 13:00

## 2021-07-19 RX ADMIN — ATENOLOL 50 MILLIGRAM(S): 25 TABLET ORAL at 08:16

## 2021-07-19 RX ADMIN — LITHIUM CARBONATE 300 MILLIGRAM(S): 300 TABLET, EXTENDED RELEASE ORAL at 08:16

## 2021-07-19 RX ADMIN — SULINDAC 200 MILLIGRAM(S): 200 TABLET ORAL at 08:17

## 2021-07-19 RX ADMIN — Medication 1 MILLIGRAM(S): at 08:17

## 2021-07-19 RX ADMIN — Medication 2 MILLIGRAM(S): at 06:00

## 2021-07-19 RX ADMIN — SULINDAC 200 MILLIGRAM(S): 200 TABLET ORAL at 09:00

## 2021-07-19 RX ADMIN — Medication 100 MILLIGRAM(S): at 20:41

## 2021-07-19 RX ADMIN — Medication 2 MILLIGRAM(S): at 15:36

## 2021-07-19 RX ADMIN — LITHIUM CARBONATE 900 MILLIGRAM(S): 300 TABLET, EXTENDED RELEASE ORAL at 20:41

## 2021-07-19 RX ADMIN — Medication 1 PATCH: at 08:17

## 2021-07-19 RX ADMIN — Medication 2 MILLIGRAM(S): at 08:16

## 2021-07-19 RX ADMIN — Medication 1 PATCH: at 07:00

## 2021-07-19 RX ADMIN — SULINDAC 100 MILLIGRAM(S): 200 TABLET ORAL at 12:11

## 2021-07-19 RX ADMIN — RISPERIDONE 4 MILLIGRAM(S): 4 TABLET ORAL at 20:41

## 2021-07-19 RX ADMIN — Medication 5 MILLIGRAM(S): at 20:41

## 2021-07-19 RX ADMIN — Medication 2 MILLIGRAM(S): at 20:40

## 2021-07-19 NOTE — BH DISCHARGE NOTE NURSING/SOCIAL WORK/PSYCH REHAB - NSCDUDCCRISIS_PSY_A_CORE
[facility]  1 (689) [facility] ([phone])  Text "WELL" to 81705  Website: www.[facility]/.[facility] 1 (400) 411-JCYJ (1894) Website: www.safehorizon.org/.National Suicide Prevention Lifeline 2 (643) 143-8766/.  [facility]  1 (714) LIF[facility] (894-0176)/.  [facility]  [address] [place] [phone]   Hours:  Monday through Friday from 9 AM to 3 PM/.  [employer]  [employer] - Veterans Crisis Line  2 [phone], Option 1

## 2021-07-19 NOTE — BH DISCHARGE NOTE NURSING/SOCIAL WORK/PSYCH REHAB - DISCHARGE INSTRUCTIONS AFTERCARE APPOINTMENTS
In order to check the location, date, or time of your aftercare appointment, please refer to your Discharge Instructions Document given to you upon leaving the hospital.  If you have lost the instructions please call [phone]

## 2021-07-19 NOTE — BH INPATIENT PSYCHIATRY PROGRESS NOTE - NSBHMSESPABN_PSY_A_CORE
Pressured rate/Other

## 2021-07-19 NOTE — BH DISCHARGE NOTE NURSING/SOCIAL WORK/PSYCH REHAB - PATIENT PORTAL LINK FT
You can access the FollowMyHealth Patient Portal offered by [facility] by registering at the following website: [email or web address]. By joining [email or web address]’s FollowMyHealth portal, you will also be able to view your health information using other applications (apps) compatible with our system.

## 2021-07-20 VITALS — TEMPERATURE: 97 F

## 2021-07-20 PROCEDURE — 99238 HOSP IP/OBS DSCHRG MGMT 30/<: CPT

## 2021-07-20 RX ADMIN — Medication 145 MILLIGRAM(S): at 08:51

## 2021-07-20 RX ADMIN — Medication 2 MILLIGRAM(S): at 02:28

## 2021-07-20 RX ADMIN — Medication 2 MILLIGRAM(S): at 06:15

## 2021-07-20 RX ADMIN — Medication 1 PATCH: at 08:51

## 2021-07-20 RX ADMIN — ATENOLOL 50 MILLIGRAM(S): 25 TABLET ORAL at 08:51

## 2021-07-20 RX ADMIN — Medication 1 MILLIGRAM(S): at 08:51

## 2021-07-20 RX ADMIN — LITHIUM CARBONATE 300 MILLIGRAM(S): 300 TABLET, EXTENDED RELEASE ORAL at 08:51

## 2021-07-20 RX ADMIN — POLYETHYLENE GLYCOL 3350 17 GRAM(S): 17 POWDER, FOR SOLUTION ORAL at 05:16

## 2021-07-20 NOTE — BH INPATIENT PSYCHIATRY DISCHARGE NOTE - NSDCCPCAREPLAN_GEN_ALL_CORE_FT
PRINCIPAL DISCHARGE DIAGNOSIS  Diagnosis: Bipolar 1 disorder  Assessment and Plan of Treatment:        PRINCIPAL DISCHARGE DIAGNOSIS  Diagnosis: Bipolar 1 disorder  Assessment and Plan of Treatment: Outpatient treatment.

## 2021-07-20 NOTE — BH INPATIENT PSYCHIATRY DISCHARGE NOTE - HPI (INCLUDE ILLNESS QUALITY, SEVERITY, DURATION, TIMING, CONTEXT, MODIFYING FACTORS, ASSOCIATED SIGNS AND SYMPTOMS)
41 year old   male with at least 2 previous psychiatric admissions (last at [facility] in 8/2018) where he was diagnosed with schizophreniform disorder , has been non complaint with treatment, with a past medical history significant for HTN, no history of violence, no legal issues, lives with 18 year old daughter who presented to the [place] ed for complaints of generalized body aches and  headaches.  While in the waiting area, he was noticed to be acting bizarrely, making statements about the “holy spirit”.  When psychiatry assessed the patient and “asked what that was about, patient reports the “holy spirit talks to him, and is here with him, kneel down and pray.   He is vague about how they communicate to him, but patient report they speak to him”    On interview today, patient reports that he took a day off from work so that he could ponder a "big decision" of whether he should attend a [employer].  He states he drove all the way to [place] to apologize to a fisherman after a recent argument.  When he got there, he felt paranoid towards the fisherman believing that they were talking about him.     He states that this hurt his hurt and that is why he went to the emergency room.  He states he is a very Yazdanism man.  Has been praying for his sick mother who was diagnosed with cancer 4 years ago.   When asked whether he sees god, hears his voice, or has a special relationship with god he could not answer.    he reports being in a good mood.  he reports 3-5hrs per sleep.  reports fair appetite. he denies si/hi with no I/i/p.        PPHX: two  previous psych admissions in 2018 and denies any since then.  He also attended the partial program at [facility] upon discharge from [facility].  On previous admission to [facility] patient was floridly psychotic, with disorganization of thought/speech, hyperreligious with Messiah delusion about himself. He described stressors of mother being diagnosed with breast cancer. Pt also talked about being "betrayed" by his wife and daughter and how they did not "respect him," and how he wanted to leave his family.   Hospital records were obtained from  [facility] hospitalization 7/22/2018-7/27/2018 indicated very similar presentation with mix of psychotic and manic-like symptoms, but was eventually diagnosed with brief psychotic episode and stabilized on risperidone. During his hospitalization at [facility], patient had gradual attenuation of his delusions and disorganiation, while he still spoke of wanting to spread peace and love, he became more rational about this.    He still had some odd behaviors, would be cleaning frequently in dining area (was thought to be possible [employer]) and was also constantly bowing and shaking hands with staff (this was just exaggerated cultural behavior - known as "insah" in Bengali). Pt would also make provocative romantic statements to select staff, but this did not emerge until pt was near his baseline, in absence of any other overt psychotic or manic symptoms, and he responded to redirection and ceased this behavior.   He was discharged on Risperdal 4mg daily     PMHX: HTN and HX of liver failure and coma     FHX: None known     SH: Lives with 17 yo daughter and wife. Previously employed as , but states he now works as a . No legal history, no abuse history, no

## 2021-07-20 NOTE — BH INPATIENT PSYCHIATRY DISCHARGE NOTE - DESCRIPTION
Lives with 17 yo daughter and wife. Previously employed as , but now reports working as a . No legal history, no abuse history, no

## 2021-07-20 NOTE — BH INPATIENT PSYCHIATRY PROGRESS NOTE - PRN MEDS
MEDICATIONS  (PRN):  diphenhydrAMINE 50 milliGRAM(s) Oral every 6 hours PRN eps  diphenhydrAMINE   Injectable 50 milliGRAM(s) IntraMuscular once PRN eps  haloperidol     Tablet 5 milliGRAM(s) Oral every 6 hours PRN psychosis  haloperidol    Injectable 5 milliGRAM(s) IntraMuscular once PRN severe psychosis  LORazepam     Tablet 2 milliGRAM(s) Oral every 6 hours PRN anxiety  LORazepam   Injectable 2 milliGRAM(s) IntraMuscular once PRN severe anxiety  nicotine  Polacrilex Gum 2 milliGRAM(s) Oral every 2 hours PRN nicotine cravings  polyethylene glycol 3350 17 Gram(s) Oral daily PRN constipation  sulindac 200 milliGRAM(s) Oral every 12 hours PRN headache  
MEDICATIONS  (PRN):  diphenhydrAMINE 50 milliGRAM(s) Oral every 6 hours PRN eps  diphenhydrAMINE   Injectable 50 milliGRAM(s) IntraMuscular once PRN eps  haloperidol     Tablet 5 milliGRAM(s) Oral every 6 hours PRN psychosis  haloperidol    Injectable 5 milliGRAM(s) IntraMuscular once PRN severe psychosis  LORazepam     Tablet 2 milliGRAM(s) Oral every 6 hours PRN anxiety  LORazepam   Injectable 2 milliGRAM(s) IntraMuscular once PRN severe anxiety  nicotine  Polacrilex Gum 2 milliGRAM(s) Oral every 2 hours PRN nicotine cravings  polyethylene glycol 3350 17 Gram(s) Oral daily PRN constipation  sulindac 200 milliGRAM(s) Oral every 12 hours PRN headache  
MEDICATIONS  (PRN):  acetaminophen   Tablet .. 650 milliGRAM(s) Oral every 6 hours PRN Mild Pain (1 - 3), Moderate Pain (4 - 6)  diphenhydrAMINE 50 milliGRAM(s) Oral every 6 hours PRN eps  diphenhydrAMINE   Injectable 50 milliGRAM(s) IntraMuscular once PRN eps  haloperidol     Tablet 5 milliGRAM(s) Oral every 6 hours PRN psychosis  haloperidol    Injectable 5 milliGRAM(s) IntraMuscular once PRN severe psychosis  LORazepam     Tablet 2 milliGRAM(s) Oral every 6 hours PRN anxiety  LORazepam   Injectable 2 milliGRAM(s) IntraMuscular once PRN severe anxiety  nicotine  Polacrilex Gum 2 milliGRAM(s) Oral every 2 hours PRN nicotine cravings  traZODone 50 milliGRAM(s) Oral at bedtime PRN insomnia  
MEDICATIONS  (PRN):  diphenhydrAMINE 50 milliGRAM(s) Oral every 6 hours PRN eps  diphenhydrAMINE   Injectable 50 milliGRAM(s) IntraMuscular once PRN eps  haloperidol     Tablet 5 milliGRAM(s) Oral every 6 hours PRN psychosis  haloperidol    Injectable 5 milliGRAM(s) IntraMuscular once PRN severe psychosis  LORazepam     Tablet 2 milliGRAM(s) Oral every 6 hours PRN anxiety  LORazepam   Injectable 2 milliGRAM(s) IntraMuscular once PRN severe anxiety  nicotine  Polacrilex Gum 2 milliGRAM(s) Oral every 2 hours PRN nicotine cravings  
MEDICATIONS  (PRN):  diphenhydrAMINE 50 milliGRAM(s) Oral every 6 hours PRN eps  diphenhydrAMINE   Injectable 50 milliGRAM(s) IntraMuscular once PRN eps  haloperidol     Tablet 5 milliGRAM(s) Oral every 6 hours PRN psychosis  haloperidol    Injectable 5 milliGRAM(s) IntraMuscular once PRN severe psychosis  LORazepam     Tablet 2 milliGRAM(s) Oral every 6 hours PRN anxiety  LORazepam   Injectable 2 milliGRAM(s) IntraMuscular once PRN severe anxiety  nicotine  Polacrilex Gum 2 milliGRAM(s) Oral every 2 hours PRN nicotine cravings  polyethylene glycol 3350 17 Gram(s) Oral daily PRN constipation  sulindac 200 milliGRAM(s) Oral every 12 hours PRN headache  traZODone 50 milliGRAM(s) Oral at bedtime PRN insomnia  
MEDICATIONS  (PRN):  diphenhydrAMINE 50 milliGRAM(s) Oral every 6 hours PRN eps  diphenhydrAMINE   Injectable 50 milliGRAM(s) IntraMuscular once PRN eps  haloperidol     Tablet 5 milliGRAM(s) Oral every 6 hours PRN psychosis  haloperidol    Injectable 5 milliGRAM(s) IntraMuscular once PRN severe psychosis  LORazepam     Tablet 2 milliGRAM(s) Oral every 6 hours PRN anxiety  LORazepam   Injectable 2 milliGRAM(s) IntraMuscular once PRN severe anxiety  nicotine  Polacrilex Gum 2 milliGRAM(s) Oral every 2 hours PRN nicotine cravings  
MEDICATIONS  (PRN):  diphenhydrAMINE 50 milliGRAM(s) Oral every 6 hours PRN eps  diphenhydrAMINE   Injectable 50 milliGRAM(s) IntraMuscular once PRN eps  haloperidol     Tablet 5 milliGRAM(s) Oral every 6 hours PRN psychosis  haloperidol    Injectable 5 milliGRAM(s) IntraMuscular once PRN severe psychosis  LORazepam     Tablet 2 milliGRAM(s) Oral every 6 hours PRN anxiety  LORazepam   Injectable 2 milliGRAM(s) IntraMuscular once PRN severe anxiety  nicotine  Polacrilex Gum 2 milliGRAM(s) Oral every 2 hours PRN nicotine cravings  polyethylene glycol 3350 17 Gram(s) Oral daily PRN constipation  sulindac 200 milliGRAM(s) Oral every 12 hours PRN headache  
MEDICATIONS  (PRN):  diphenhydrAMINE 50 milliGRAM(s) Oral every 6 hours PRN eps  diphenhydrAMINE   Injectable 50 milliGRAM(s) IntraMuscular once PRN eps  haloperidol     Tablet 5 milliGRAM(s) Oral every 6 hours PRN psychosis  haloperidol    Injectable 5 milliGRAM(s) IntraMuscular once PRN severe psychosis  LORazepam     Tablet 2 milliGRAM(s) Oral every 6 hours PRN anxiety  LORazepam   Injectable 2 milliGRAM(s) IntraMuscular once PRN severe anxiety  nicotine  Polacrilex Gum 2 milliGRAM(s) Oral every 2 hours PRN nicotine cravings  sulindac 200 milliGRAM(s) Oral every 12 hours PRN headache  traZODone 50 milliGRAM(s) Oral at bedtime PRN insomnia  
MEDICATIONS  (PRN):  diphenhydrAMINE 50 milliGRAM(s) Oral every 6 hours PRN eps  diphenhydrAMINE   Injectable 50 milliGRAM(s) IntraMuscular once PRN eps  haloperidol     Tablet 5 milliGRAM(s) Oral every 6 hours PRN psychosis  haloperidol    Injectable 5 milliGRAM(s) IntraMuscular once PRN severe psychosis  LORazepam     Tablet 2 milliGRAM(s) Oral every 6 hours PRN anxiety  LORazepam   Injectable 2 milliGRAM(s) IntraMuscular once PRN severe anxiety  nicotine  Polacrilex Gum 2 milliGRAM(s) Oral every 2 hours PRN nicotine cravings  traZODone 50 milliGRAM(s) Oral at bedtime PRN insomnia

## 2021-07-20 NOTE — BH INPATIENT PSYCHIATRY PROGRESS NOTE - NSTXMEDICDATEEST_PSY_ALL_CORE
10-Jul-2021
14-Jul-2021
10-Jul-2021
14-Jul-2021
10-Jul-2021
17-Jul-2021
14-Jul-2021
10-Jul-2021
10-Jul-2021

## 2021-07-20 NOTE — BH INPATIENT PSYCHIATRY PROGRESS NOTE - NSBHATTESTSEENBY_PSY_A_CORE
NP without Attending Psychiatrist

## 2021-07-20 NOTE — BH INPATIENT PSYCHIATRY PROGRESS NOTE - NSBHMSEBEHAV_PSY_A_CORE
no
Cooperative

## 2021-07-20 NOTE — BH INPATIENT PSYCHIATRY PROGRESS NOTE - NSBHMSEAFFQUAL_PSY_A_CORE
Elevated
Elevated
Depressed
Depressed
Depressed/Irritable
Elevated
Elevated
Elevated/Irritable
Elevated

## 2021-07-20 NOTE — BH INPATIENT PSYCHIATRY DISCHARGE NOTE - NSBHMETABOLIC_PSY_ALL_CORE_FT
BMI: BMI (kg/m2): 32.9 (07-09-21 @ 20:36)  HbA1c: A1C with Estimated Average Glucose Result: 5.3 % (07-13-21 @ 10:05)    Glucose:   BP: --  Lipid Panel: Date/Time: 07-13-21 @ 10:05  Cholesterol, Serum: 166  Direct LDL: --  HDL Cholesterol, Serum: 35  Total Cholesterol/HDL Ration Measurement: --  Triglycerides, Serum: 285

## 2021-07-20 NOTE — BH INPATIENT PSYCHIATRY PROGRESS NOTE - NSTXPROBDCOPLK_PSY_ALL_CORE
DISCHARGE ISSUE - LACK OF APPROPRIATE OUTPATIENT SERVICES

## 2021-07-20 NOTE — BH INPATIENT PSYCHIATRY PROGRESS NOTE - NSICDXBHPRIMARYDX_PSY_ALL_CORE
Psychosis   F29  

## 2021-07-20 NOTE — BH INPATIENT PSYCHIATRY DISCHARGE NOTE - NSBHDCRISKMITIGATE_PSY_ALL_CORE
Safety planning/Medications targeting suicidality/non-suicidal self injurious behavior/Assisted outpatient treatment

## 2021-07-20 NOTE — BH INPATIENT PSYCHIATRY PROGRESS NOTE - NSBHMSEPERCEPT_PSY_A_CORE
No abnormalities
No abnormalities/Other
No abnormalities
No abnormalities
No abnormalities/Other
No abnormalities
No abnormalities

## 2021-07-20 NOTE — BH INPATIENT PSYCHIATRY DISCHARGE NOTE - NSBHDCHANDOFFFT_PSY_ALL_CORE
[facility] 23-Jul-2021 09:00 @ [phone]. As per clinic, they prefer discharge summary to be faxed to [phone].

## 2021-07-20 NOTE — BH INPATIENT PSYCHIATRY PROGRESS NOTE - NSTXMEDICINTERMD_PSY_ALL_CORE
Medication education/ adherence and JO will be offered. 

## 2021-07-20 NOTE — BH INPATIENT PSYCHIATRY PROGRESS NOTE - NSTXPSYCHOGOAL_PSY_ALL_CORE
Will engage in a 15 minute conversation with no irrational content

## 2021-07-20 NOTE — BH INPATIENT PSYCHIATRY PROGRESS NOTE - NSBHFUPINTERVALHXFT_PSY_A_CORE
Patient seen for follow up for bipolar disorder with presenting symptoms of leann, chart reviewed, and case discussed with treatment team.  No events reported overnight and no PRN medication required for agitation. He is visible on the unit and in fair to good behavorial control. He is less intrusive with peers but needs redirection at times. Somewhat religiously preoccupied. Patient had poor sleep last night due to being "nervous about leaving", though had adequate sleep within the past week. Appetite remains adequate. Denies SI/ HI, intent and plan. Denies perceptual disturbances such as AH, VH, TH. No delusions, paranoia noted. He is adherent to medication and denies side effects. Continued medication adherence encouraged. The patient was included in the treatment team meeting to discuss safety concerns, treatment plan and continued outpatient treatment. The patient verbalized understanding. 
Patient seen for follow up for bipolar disorder with presenting symptoms of leann, chart reviewed, and case discussed with treatment team.  No events reported overnight and no PRN medication required for agitation. The patient was discharge focused and constantly request to be discharge today. The patient stated "I have to take care of my mother". The writer/ DARYA and his daughter Vivian discussed discharge plans via phone. He is visible on the unit and in fair to good behavorial control. He is less intrusive with peers. Somewhat hyperreligious and irritable at times. Sleep and appetite remains adequate. Denies SI, intent and plan. Denies perceptual disturbances such as AH, VH, TH. He is adherent to medication and denies side effects. Medication adherence encouraged. Psychotherapy and fresh air breaks encouraged. Discussed treatment and discharge plans today.
f/up psychosis, Patient reported that he went fishing at [place] and then on the way back as he was driving, guys were cursing at him which led him to get angry, which resulted in his "heart breaking", which prompted him to come to the ED. As per nursing, patient is intrusive with other peers on the unit. Patient with grandiose delusions, stating that he is a "leader", "". Patient reported fair sleep and appetite. Receiving prns. 
Patient seen for follow up for bipolar disorder with presenting symptoms of leann, chart reviewed, and case discussed with treatment team.  No events reported overnight and no PRN medication required for agitation. He continues to be visible on the unit and in fair to good behavorial control. Noted he paces the hallways constantly. He continues to demonstrate some improvement: hyperverbal, hyperreligious, tangential, overinclusive and poor concentration. No delusions elicited today. The patient slept well last night as per patient/ sleep log. His appetite remains adequate. Denies SI, intent and plan. Denies perceptual disturbances such as AH, VH, TH. He is adherent to medication and denies side effects. Psychotherapy and fresh air breaks encouraged. Patient is discharge focused today. Discussed treatment and discharge plans today.
Patient seen for follow up for bipolar disorder with presenting symptoms of leann, chart reviewed, and case discussed with treatment team.  No events reported overnight thought PRN medication for agitation required (see EMAR). He continues to be visible on the unit and in fair behavorial control. Noted to be less intrusive with others today. Continues to present with but demonstrates some improvement: hyperverbal, hyperreligious, tangential, overinclusive and poor concentration. Noted with delusional themes. His sleep is improving as per sleep log and patient's report. His appetite remains adequate. Denies SI, intent and plan. Denies perceptual disturbances such as AH, VH, TH. Lithium discussed with the patient. He is adherent to medication and denies side effects. Psychotherapy and fresh air breaks encouraged. 
Patient seen for follow up for bipolar disorder with presenting symptoms of leann, chart reviewed, and case discussed with treatment team.  No events reported overnight thought PRN medication for agitation required (see EMAR). He is visible on the unit and in fair behavorial control. During the f/u, the patient presented hyperverbal, hyperreligious, tangential, overinclusive, poor concentration. Noted with delusional themes. He noted poor sleep (3 hours) for the past 2 weeks, increased energy (going to the gym, working fulltime/ overtime, talking care of his mother and family). Reported recent stress related to his mother poor health and being in hospice for terminal cancer. Denies SI, intent and plan. Denies perceptual disturbances such as AH, VH, TH. Medication/ treatment discussed and patient and his daughter Vivian [phone] and they verbalized understanding. 
Patient seen for follow up for bipolar disorder with presenting symptoms of leann, chart reviewed, and case discussed with treatment team.  No events reported overnight and no PRN medication required for agitation. He continues to be visible on the unit and in fair to good behavorial control. Spoke to patient about poor boundaries and being intrusive with peers. Patient verbalized understanding. Noted improving symptoms as follows: hyperverbal, hyperreligious, tangential, overinclusive and poor concentration. No delusions elicited today. The patient slept well last night as per patient/ sleep log and his appetite remains adequate. Denies SI, intent and plan. Denies perceptual disturbances such as AH, VH, TH. He is adherent to medication and denies side effects. Psychotherapy and fresh air breaks encouraged. Patient continues to be discharge focused and stated "I need to see my mom". Discussed treatment and discharge plans today.
f/up psychosis, Patient reported that he was trying to "make everyone read the bible". Patient reported that everyone loves him here. Patient is grandiose and illogical. Reported fair sleep and appetite, taking meds. Patient denies SE, agree to increase Risperal, no td, stiffness observed/ reported. Observed in the milieu with bible, attending groups and intrusive with peers. 
Patient seen for follow up for bipolar disorder with presenting symptoms of leann, chart reviewed, and case discussed with treatment team.  No events reported overnight thought PRN medication for agitation required (see EMAR). He continues to be visible on the unit and in fair to good behavorial control. He continues to be less intrusive. He demonstrates some improvement: hyperverbal, hyperreligious, tangential, overinclusive and poor concentration. No delusions or hyperreligiosity elicited today. His sleep is improving as per sleep log and patient's report (6 hours of sleep last night). His appetite remains adequate. Denies SI, intent and plan. Denies perceptual disturbances such as AH, VH, TH. He is adherent to medication and denies side effects. Patient is discharge focused due to his mother's terminal illness at home. Discharge planning discussed at this time. Psychotherapy and fresh air breaks encouraged.

## 2021-07-20 NOTE — BH INPATIENT PSYCHIATRY PROGRESS NOTE - NSBHASSESSSUMMFT_PSY_ALL_CORE
41 year old   male with at least 2 previous psychiatric admissions (last at [facility] in 8/2018) where he was diagnosed with schizophreniform disorder , has been non compliant with treatment, with a past medical history significant for HTN, no history of violence, no legal issues, lives with 18 year old daughter who presented to the [place] ed for complaints of generalized body  aches and  headaches.  While in the waiting area, he was noticed to be acting bizarrely, making statements about the “holy spirit”.  When psychiatry assessed the patient and “asked what that was about, patient reports the “holy spirit talks to him, and is here with him, kneel down and pray.   He is vague about how they communicate to him, but patient report they speak to him”.  On eval at [facility], patient delusional, with a thought disorder.       Moderate improvement with presenting symptoms: hyperverbal, hyperreligious, grandiose, labile, tangential, overinclusive, intrusive with peers, poor concentration. Noted with delusional themes. He noted poor sleep (3 hours) for the past 2 weeks, increased energy (going to the gym, working fulltime/ overtime, talking care of his mother and family). Reported recent stress related to his mother poor health and being in hospice for terminal cancer. Denies SI, intent and plan. Denies perceptual disturbances such as AH, VH, TH.    >Obs: Routine, no current SI. no need for CO, patient not expected to pose risk to self or others in controlled inpatient setting  >Psychiatric Meds: Risperdal increased to 6mg po at bedtime, lithium adjusted to 1200mg po at bedtime for mood stabilization. Clonazepam 1mg po at bedtime.   >Labs: Lithium level was 0.5 on 7/16am.   >Medical:  No acute concerns  >Social: milieu therapy  >Treatment Interventions: Groups and Individual Therapy  >Dispo: Discharge home today 7/20.       
41 year old   male with at least 2 previous psychiatric admissions (last at [facility] in 8/2018) where he was diagnosed with schizophreniform disorder , has been non compliant with treatment, with a past medical history significant for HTN, no history of violence, no legal issues, lives with 18 year old daughter who presented to the [place] ed for complaints of generalized body  aches and  headaches.  While in the waiting area, he was noticed to be acting bizarrely, making statements about the “holy spirit”.  When psychiatry assessed the patient and “asked what that was about, patient reports the “holy spirit talks to him, and is here with him, kneel down and pray.   He is vague about how they communicate to him, but patient report they speak to him”.  On eval at [facility], patient delusional, with a thought disorder.       Improving presenting symptoms: hyperverbal, hyperreligious, grandiose, labile, tangential, overinclusive, intrusive with peers, poor concentration. Noted with delusional themes. He noted poor sleep (3 hours) for the past 2 weeks, increased energy (going to the gym, working fulltime/ overtime, talking care of his mother and family). Reported recent stress related to his mother poor health and being in hospice for terminal cancer. Denies SI, intent and plan. Denies perceptual disturbances such as AH, VH, TH.    >Obs: Routine, no current SI. no need for CO, patient not expected to pose risk to self or others in controlled inpatient setting  >Psychiatric Meds: Risperdal increased to 6mg po at bedtime, lithium increased to 300mg po daily and 900mg po at bedtime for mood stabilization. Clonazepam 1mg po at bedtime.   >Labs: Lithium level was 0.5 on 7/16am.   >Medical:  No acute concerns  >Social: milieu therapy  >Treatment Interventions: Groups and Individual Therapy  >Dispo: Collateral and dispo planning pending further symptom and medication optimization.      
41 year old   male with at least 2 previous psychiatric admissions (last at [facility] in 8/2018) where he was diagnosed with schizophreniform disorder , has been non compliant with treatment, with a past medical history significant for HTN, no history of violence, no legal issues, lives with 18 year old daughter who presented to the [place] ed for complaints of generalized body  aches and  headaches.  While in the waiting area, he was noticed to be acting bizarrely, making statements about the “holy spirit”.  When psychiatry assessed the patient and “asked what that was about, patient reports the “holy spirit talks to him, and is here with him, kneel down and pray.   He is vague about how they communicate to him, but patient report they speak to him”.  On eval at [facility], patient delusional, with a thought disorder.       on assessment today, patient presents as grandiose, with elevated mood, internally preoccupied, with disorganized tp, illogical, circumstantial, intrusive on the unit with peer, receiving prns  c/w  risperdal 1mg hs  --monitor BP  haldol and ativan prn for agitation and anxiety respectively
41 year old   male with at least 2 previous psychiatric admissions (last at [facility] in 8/2018) where he was diagnosed with schizophreniform disorder , has been non compliant with treatment, with a past medical history significant for HTN, no history of violence, no legal issues, lives with 18 year old daughter who presented to the [place] ed for complaints of generalized body  aches and  headaches.  While in the waiting area, he was noticed to be acting bizarrely, making statements about the “holy spirit”.  When psychiatry assessed the patient and “asked what that was about, patient reports the “holy spirit talks to him, and is here with him, kneel down and pray.   He is vague about how they communicate to him, but patient report they speak to him”.  On eval at [facility], patient delusional, with a thought disorder.       Improving presenting symptoms: hyperverbal, hyperreligious, grandiose, labile, tangential, overinclusive, intrusive with peers, poor concentration. Noted with delusional themes. He noted poor sleep (3 hours) for the past 2 weeks, increased energy (going to the gym, working fulltime/ overtime, talking care of his mother and family). Reported recent stress related to his mother poor health and being in hospice for terminal cancer. Denies SI, intent and plan. Denies perceptual disturbances such as AH, VH, TH.    >Obs: Routine, no current SI. no need for CO, patient not expected to pose risk to self or others in controlled inpatient setting  >Psychiatric Meds: Risperdal increased to 4mg po at bedtime, lithium increased to 300mg po daily and 900mg po at bedtime for mood stabilization. Clonazepam 1mg po at bedtime.   >Labs: Lithium level was 0.5 on 7/16am.   >Medical:  No acute concerns  >Social: milieu therapy  >Treatment Interventions: Groups and Individual Therapy  >Dispo: Collateral and dispo planning pending further symptom and medication optimization.      
41 year old   male with at least 2 previous psychiatric admissions (last at [facility] in 8/2018) where he was diagnosed with schizophreniform disorder , has been non compliant with treatment, with a past medical history significant for HTN, no history of violence, no legal issues, lives with 18 year old daughter who presented to the [place] ed for complaints of generalized body  aches and  headaches.  While in the waiting area, he was noticed to be acting bizarrely, making statements about the “holy spirit”.  When psychiatry assessed the patient and “asked what that was about, patient reports the “holy spirit talks to him, and is here with him, kneel down and pray.   He is vague about how they communicate to him, but patient report they speak to him”.  On eval at [facility], patient delusional, with a thought disorder.       Improving presenting symptoms: hyperverbal, hyperreligious, grandiose, labile, tangential, overinclusive, intrusive with peers, poor concentration. Noted with delusional themes. He noted poor sleep (3 hours) for the past 2 weeks, increased energy (going to the gym, working fulltime/ overtime, talking care of his mother and family). Reported recent stress related to his mother poor health and being in hospice for terminal cancer. Denies SI, intent and plan. Denies perceptual disturbances such as AH, VH, TH.    >Obs: Routine, no current SI. no need for CO, patient not expected to pose risk to self or others in controlled inpatient setting  >Psychiatric Meds: Risperdal increased to 4mg po at bedtime, lithium increased to 300mg po daily and 600mg po at bedtime for mood stabilization.    >Labs: Lithium level ordered for 7/15am.  >Medical:  No acute concerns  >Social: milieu therapy  >Treatment Interventions: Groups and Individual Therapy  >Dispo: Collateral and dispo planning pending further symptom and medication optimization.      
41 year old   male with at least 2 previous psychiatric admissions (last at [facility] in 8/2018) where he was diagnosed with schizophreniform disorder , has been non compliant with treatment, with a past medical history significant for HTN, no history of violence, no legal issues, lives with 18 year old daughter who presented to the [place] ed for complaints of generalized body  aches and  headaches.  While in the waiting area, he was noticed to be acting bizarrely, making statements about the “holy spirit”.  When psychiatry assessed the patient and “asked what that was about, patient reports the “holy spirit talks to him, and is here with him, kneel down and pray.   He is vague about how they communicate to him, but patient report they speak to him”.  On eval at [facility], patient delusional, with a thought disorder.       on assessment today, patient presents as grandiose, with elevated mood, internally preoccupied, with disorganized tp, illogical, circumstantial, intrusive on the unit with peer     increase risperdal 2mg hs  --monitor BP  haldol and ativan prn for agitation and anxiety respectively
41 year old   male with at least 2 previous psychiatric admissions (last at [facility] in 8/2018) where he was diagnosed with schizophreniform disorder , has been non compliant with treatment, with a past medical history significant for HTN, no history of violence, no legal issues, lives with 18 year old daughter who presented to the [place] ed for complaints of generalized body  aches and  headaches.  While in the waiting area, he was noticed to be acting bizarrely, making statements about the “holy spirit”.  When psychiatry assessed the patient and “asked what that was about, patient reports the “holy spirit talks to him, and is here with him, kneel down and pray.   He is vague about how they communicate to him, but patient report they speak to him”.  On eval at [facility], patient delusional, with a thought disorder.       Presenting symptoms: hyperverbal, hyperreligious, grandiose, labile, tangential, overinclusive, intrusive with peers, poor concentration. Noted with delusional themes. He noted poor sleep (3 hours) for the past 2 weeks, increased energy (going to the gym, working fulltime/ overtime, talking care of his mother and family). Reported recent stress related to his mother poor health and being in hospice for terminal cancer. Denies SI, intent and plan. Denies perceptual disturbances such as AH, VH, TH.    >Obs: Routine, no current SI. no need for CO, patient not expected to pose risk to self or others in controlled inpatient setting  >Psychiatric Meds: Risperdal increased to 3mg po at bedtime. Intentions to start  Lithium after CR level/ kidney results on 7/13.   >Labs ordered for 7/13am.  >Medical:  No acute concerns  >Social: milieu therapy  >Treatment Interventions: Groups and Individual Therapy  >Dispo: Collateral and dispo planning pending further symptom and medication optimization.      
41 year old   male with at least 2 previous psychiatric admissions (last at [facility] in 8/2018) where he was diagnosed with schizophreniform disorder , has been non compliant with treatment, with a past medical history significant for HTN, no history of violence, no legal issues, lives with 18 year old daughter who presented to the [place] ed for complaints of generalized body  aches and  headaches.  While in the waiting area, he was noticed to be acting bizarrely, making statements about the “holy spirit”.  When psychiatry assessed the patient and “asked what that was about, patient reports the “holy spirit talks to him, and is here with him, kneel down and pray.   He is vague about how they communicate to him, but patient report they speak to him”.  On eval at [facility], patient delusional, with a thought disorder.       Presenting symptoms: hyperverbal, hyperreligious, grandiose, labile, tangential, overinclusive, intrusive with peers, poor concentration. Noted with delusional themes. He noted poor sleep (3 hours) for the past 2 weeks, increased energy (going to the gym, working fulltime/ overtime, talking care of his mother and family). Reported recent stress related to his mother poor health and being in hospice for terminal cancer. Denies SI, intent and plan. Denies perceptual disturbances such as AH, VH, TH.    >Obs: Routine, no current SI. no need for CO, patient not expected to pose risk to self or others in controlled inpatient setting  >Psychiatric Meds: Risperdal increased to 4mg po at bedtime, lithium 300mg po twice a day ordered for mood stabilization.    >Labs ordered for 7/13am. Kidney function unremarkable.  >Medical:  No acute concerns  >Social: milieu therapy  >Treatment Interventions: Groups and Individual Therapy  >Dispo: Collateral and dispo planning pending further symptom and medication optimization.      
41 year old   male with at least 2 previous psychiatric admissions (last at [facility] in 8/2018) where he was diagnosed with schizophreniform disorder , has been non compliant with treatment, with a past medical history significant for HTN, no history of violence, no legal issues, lives with 18 year old daughter who presented to the [place] ed for complaints of generalized body  aches and  headaches.  While in the waiting area, he was noticed to be acting bizarrely, making statements about the “holy spirit”.  When psychiatry assessed the patient and “asked what that was about, patient reports the “holy spirit talks to him, and is here with him, kneel down and pray.   He is vague about how they communicate to him, but patient report they speak to him”.  On eval at [facility], patient delusional, with a thought disorder.       Improving presenting symptoms: hyperverbal, hyperreligious, grandiose, labile, tangential, overinclusive, intrusive with peers, poor concentration. Noted with delusional themes. He noted poor sleep (3 hours) for the past 2 weeks, increased energy (going to the gym, working fulltime/ overtime, talking care of his mother and family). Reported recent stress related to his mother poor health and being in hospice for terminal cancer. Denies SI, intent and plan. Denies perceptual disturbances such as AH, VH, TH.    >Obs: Routine, no current SI. no need for CO, patient not expected to pose risk to self or others in controlled inpatient setting  >Psychiatric Meds: Risperdal increased to 4mg po at bedtime, lithium increased to 300mg po daily and 600mg po at bedtime for mood stabilization.    >Labs: Lithium level ordered for 7/16am.  >Medical:  No acute concerns  >Social: milieu therapy  >Treatment Interventions: Groups and Individual Therapy  >Dispo: Collateral and dispo planning pending further symptom and medication optimization.

## 2021-07-20 NOTE — BH INPATIENT PSYCHIATRY PROGRESS NOTE - MSE OPTIONS
Structured MSE

## 2021-07-20 NOTE — BH INPATIENT PSYCHIATRY PROGRESS NOTE - GENERAL APPEARANCE
No deformities present

## 2021-07-20 NOTE — BH INPATIENT PSYCHIATRY PROGRESS NOTE - NSTXPSYCHOPROGRES_PSY_ALL_CORE
Worsening
Worsening
Met - goal discontinued
Worsening

## 2021-07-20 NOTE — BH INPATIENT PSYCHIATRY PROGRESS NOTE - NSBHMSETHTPROC_PSY_A_CORE
Disorganized/Overinclusive/Circumstantial/Impaired reasoning/Other
Linear/Overinclusive/Other
Disorganized/Overinclusive/Circumstantial/Illogical/Impaired reasoning
Disorganized/Overinclusive/Circumstantial/Illogical/Impaired reasoning
Disorganized/Overinclusive/Circumstantial/Impaired reasoning
Disorganized/Overinclusive/Circumstantial/Illogical/Impaired reasoning
Overinclusive/Circumstantial/Impaired reasoning/Other
Disorganized/Overinclusive/Circumstantial/Impaired reasoning/Other
Linear/Overinclusive/Other

## 2021-07-20 NOTE — BH INPATIENT PSYCHIATRY PROGRESS NOTE - NSTXPSYCHODATEEST_PSY_ALL_CORE
09-Jul-2021

## 2021-07-20 NOTE — BH INPATIENT PSYCHIATRY PROGRESS NOTE - CURRENT MEDICATION
MEDICATIONS  (STANDING):  ATENolol  Tablet 50 milliGRAM(s) Oral daily  clonazePAM  Tablet 1 milliGRAM(s) Oral two times a day  fenofibrate Tablet 145 milliGRAM(s) Oral daily  lithium SR (LITHOBID) 900 milliGRAM(s) Oral at bedtime  lithium SR (LITHOBID) 300 milliGRAM(s) Oral daily  melatonin. 5 milliGRAM(s) Oral at bedtime  nicotine - 21 mG/24Hr(s) Patch 1 patch Transdermal daily  risperiDONE  Disintegrating Tablet 4 milliGRAM(s) Oral at bedtime  traZODone 100 milliGRAM(s) Oral at bedtime    MEDICATIONS  (PRN):  diphenhydrAMINE 50 milliGRAM(s) Oral every 6 hours PRN eps  diphenhydrAMINE   Injectable 50 milliGRAM(s) IntraMuscular once PRN eps  haloperidol     Tablet 5 milliGRAM(s) Oral every 6 hours PRN psychosis  haloperidol    Injectable 5 milliGRAM(s) IntraMuscular once PRN severe psychosis  LORazepam     Tablet 2 milliGRAM(s) Oral every 6 hours PRN anxiety  LORazepam   Injectable 2 milliGRAM(s) IntraMuscular once PRN severe anxiety  nicotine  Polacrilex Gum 2 milliGRAM(s) Oral every 2 hours PRN nicotine cravings  polyethylene glycol 3350 17 Gram(s) Oral daily PRN constipation  sulindac 200 milliGRAM(s) Oral every 12 hours PRN headache  
MEDICATIONS  (STANDING):  ATENolol  Tablet 50 milliGRAM(s) Oral daily  melatonin. 5 milliGRAM(s) Oral at bedtime  nicotine - 21 mG/24Hr(s) Patch 1 patch Transdermal daily  risperiDONE  Disintegrating Tablet 3 milliGRAM(s) Oral at bedtime    MEDICATIONS  (PRN):  diphenhydrAMINE 50 milliGRAM(s) Oral every 6 hours PRN eps  diphenhydrAMINE   Injectable 50 milliGRAM(s) IntraMuscular once PRN eps  haloperidol     Tablet 5 milliGRAM(s) Oral every 6 hours PRN psychosis  haloperidol    Injectable 5 milliGRAM(s) IntraMuscular once PRN severe psychosis  LORazepam     Tablet 2 milliGRAM(s) Oral every 6 hours PRN anxiety  LORazepam   Injectable 2 milliGRAM(s) IntraMuscular once PRN severe anxiety  nicotine  Polacrilex Gum 2 milliGRAM(s) Oral every 2 hours PRN nicotine cravings  traZODone 50 milliGRAM(s) Oral at bedtime PRN insomnia  
MEDICATIONS  (STANDING):  ATENolol  Tablet 50 milliGRAM(s) Oral daily  fenofibrate Tablet 145 milliGRAM(s) Oral daily  lithium SR (LITHOBID) 600 milliGRAM(s) Oral at bedtime  lithium SR (LITHOBID) 300 milliGRAM(s) Oral daily  melatonin. 5 milliGRAM(s) Oral at bedtime  nicotine - 21 mG/24Hr(s) Patch 1 patch Transdermal daily  risperiDONE  Disintegrating Tablet 4 milliGRAM(s) Oral at bedtime    MEDICATIONS  (PRN):  diphenhydrAMINE 50 milliGRAM(s) Oral every 6 hours PRN eps  diphenhydrAMINE   Injectable 50 milliGRAM(s) IntraMuscular once PRN eps  haloperidol     Tablet 5 milliGRAM(s) Oral every 6 hours PRN psychosis  haloperidol    Injectable 5 milliGRAM(s) IntraMuscular once PRN severe psychosis  LORazepam     Tablet 2 milliGRAM(s) Oral every 6 hours PRN anxiety  LORazepam   Injectable 2 milliGRAM(s) IntraMuscular once PRN severe anxiety  nicotine  Polacrilex Gum 2 milliGRAM(s) Oral every 2 hours PRN nicotine cravings  polyethylene glycol 3350 17 Gram(s) Oral daily PRN constipation  sulindac 200 milliGRAM(s) Oral every 12 hours PRN headache  traZODone 50 milliGRAM(s) Oral at bedtime PRN insomnia  
MEDICATIONS  (STANDING):  ATENolol  Tablet 50 milliGRAM(s) Oral daily  clonazePAM  Tablet 1 milliGRAM(s) Oral two times a day  fenofibrate Tablet 145 milliGRAM(s) Oral daily  lithium SR (LITHOBID) 900 milliGRAM(s) Oral at bedtime  lithium SR (LITHOBID) 300 milliGRAM(s) Oral daily  melatonin. 5 milliGRAM(s) Oral at bedtime  nicotine - 21 mG/24Hr(s) Patch 1 patch Transdermal daily  risperiDONE  Disintegrating Tablet 4 milliGRAM(s) Oral at bedtime  traZODone 100 milliGRAM(s) Oral at bedtime    MEDICATIONS  (PRN):  diphenhydrAMINE 50 milliGRAM(s) Oral every 6 hours PRN eps  diphenhydrAMINE   Injectable 50 milliGRAM(s) IntraMuscular once PRN eps  haloperidol     Tablet 5 milliGRAM(s) Oral every 6 hours PRN psychosis  haloperidol    Injectable 5 milliGRAM(s) IntraMuscular once PRN severe psychosis  LORazepam     Tablet 2 milliGRAM(s) Oral every 6 hours PRN anxiety  LORazepam   Injectable 2 milliGRAM(s) IntraMuscular once PRN severe anxiety  nicotine  Polacrilex Gum 2 milliGRAM(s) Oral every 2 hours PRN nicotine cravings  polyethylene glycol 3350 17 Gram(s) Oral daily PRN constipation  sulindac 200 milliGRAM(s) Oral every 12 hours PRN headache  
MEDICATIONS  (STANDING):  ATENolol  Tablet 50 milliGRAM(s) Oral daily  lithium SR (LITHOBID) 300 milliGRAM(s) Oral two times a day  melatonin. 5 milliGRAM(s) Oral at bedtime  nicotine - 21 mG/24Hr(s) Patch 1 patch Transdermal daily  risperiDONE  Disintegrating Tablet 4 milliGRAM(s) Oral at bedtime    MEDICATIONS  (PRN):  acetaminophen   Tablet .. 650 milliGRAM(s) Oral every 6 hours PRN Mild Pain (1 - 3), Moderate Pain (4 - 6)  diphenhydrAMINE 50 milliGRAM(s) Oral every 6 hours PRN eps  diphenhydrAMINE   Injectable 50 milliGRAM(s) IntraMuscular once PRN eps  haloperidol     Tablet 5 milliGRAM(s) Oral every 6 hours PRN psychosis  haloperidol    Injectable 5 milliGRAM(s) IntraMuscular once PRN severe psychosis  LORazepam     Tablet 2 milliGRAM(s) Oral every 6 hours PRN anxiety  LORazepam   Injectable 2 milliGRAM(s) IntraMuscular once PRN severe anxiety  nicotine  Polacrilex Gum 2 milliGRAM(s) Oral every 2 hours PRN nicotine cravings  traZODone 50 milliGRAM(s) Oral at bedtime PRN insomnia  
MEDICATIONS  (STANDING):  nicotine - 21 mG/24Hr(s) Patch 1 patch Transdermal daily  risperiDONE   Tablet 1 milliGRAM(s) Oral at bedtime    MEDICATIONS  (PRN):  diphenhydrAMINE 50 milliGRAM(s) Oral every 6 hours PRN eps  diphenhydrAMINE   Injectable 50 milliGRAM(s) IntraMuscular once PRN eps  haloperidol     Tablet 5 milliGRAM(s) Oral every 6 hours PRN psychosis  haloperidol    Injectable 5 milliGRAM(s) IntraMuscular once PRN severe psychosis  LORazepam     Tablet 2 milliGRAM(s) Oral every 6 hours PRN anxiety  LORazepam   Injectable 2 milliGRAM(s) IntraMuscular once PRN severe anxiety  nicotine  Polacrilex Gum 2 milliGRAM(s) Oral every 2 hours PRN nicotine cravings  
MEDICATIONS  (STANDING):  ATENolol  Tablet 50 milliGRAM(s) Oral daily  nicotine - 21 mG/24Hr(s) Patch 1 patch Transdermal daily  risperiDONE   Tablet 1 milliGRAM(s) Oral at bedtime    MEDICATIONS  (PRN):  diphenhydrAMINE 50 milliGRAM(s) Oral every 6 hours PRN eps  diphenhydrAMINE   Injectable 50 milliGRAM(s) IntraMuscular once PRN eps  haloperidol     Tablet 5 milliGRAM(s) Oral every 6 hours PRN psychosis  haloperidol    Injectable 5 milliGRAM(s) IntraMuscular once PRN severe psychosis  LORazepam     Tablet 2 milliGRAM(s) Oral every 6 hours PRN anxiety  LORazepam   Injectable 2 milliGRAM(s) IntraMuscular once PRN severe anxiety  nicotine  Polacrilex Gum 2 milliGRAM(s) Oral every 2 hours PRN nicotine cravings  
MEDICATIONS  (STANDING):  ATENolol  Tablet 50 milliGRAM(s) Oral daily  lithium SR (LITHOBID) 600 milliGRAM(s) Oral at bedtime  melatonin. 5 milliGRAM(s) Oral at bedtime  nicotine - 21 mG/24Hr(s) Patch 1 patch Transdermal daily  risperiDONE  Disintegrating Tablet 4 milliGRAM(s) Oral at bedtime    MEDICATIONS  (PRN):  diphenhydrAMINE 50 milliGRAM(s) Oral every 6 hours PRN eps  diphenhydrAMINE   Injectable 50 milliGRAM(s) IntraMuscular once PRN eps  haloperidol     Tablet 5 milliGRAM(s) Oral every 6 hours PRN psychosis  haloperidol    Injectable 5 milliGRAM(s) IntraMuscular once PRN severe psychosis  LORazepam     Tablet 2 milliGRAM(s) Oral every 6 hours PRN anxiety  LORazepam   Injectable 2 milliGRAM(s) IntraMuscular once PRN severe anxiety  nicotine  Polacrilex Gum 2 milliGRAM(s) Oral every 2 hours PRN nicotine cravings  sulindac 200 milliGRAM(s) Oral every 12 hours PRN headache  traZODone 50 milliGRAM(s) Oral at bedtime PRN insomnia  
MEDICATIONS  (STANDING):  ATENolol  Tablet 50 milliGRAM(s) Oral daily  clonazePAM  Tablet 1 milliGRAM(s) Oral at bedtime  fenofibrate Tablet 145 milliGRAM(s) Oral daily  lithium SR (LITHOBID) 900 milliGRAM(s) Oral at bedtime  lithium SR (LITHOBID) 300 milliGRAM(s) Oral daily  melatonin. 5 milliGRAM(s) Oral at bedtime  nicotine - 21 mG/24Hr(s) Patch 1 patch Transdermal daily  risperiDONE  Disintegrating Tablet 4 milliGRAM(s) Oral at bedtime  traZODone 100 milliGRAM(s) Oral at bedtime    MEDICATIONS  (PRN):  diphenhydrAMINE 50 milliGRAM(s) Oral every 6 hours PRN eps  diphenhydrAMINE   Injectable 50 milliGRAM(s) IntraMuscular once PRN eps  haloperidol     Tablet 5 milliGRAM(s) Oral every 6 hours PRN psychosis  haloperidol    Injectable 5 milliGRAM(s) IntraMuscular once PRN severe psychosis  LORazepam     Tablet 2 milliGRAM(s) Oral every 6 hours PRN anxiety  LORazepam   Injectable 2 milliGRAM(s) IntraMuscular once PRN severe anxiety  nicotine  Polacrilex Gum 2 milliGRAM(s) Oral every 2 hours PRN nicotine cravings  polyethylene glycol 3350 17 Gram(s) Oral daily PRN constipation  sulindac 200 milliGRAM(s) Oral every 12 hours PRN headache

## 2021-07-20 NOTE — BH INPATIENT PSYCHIATRY PROGRESS NOTE - NSBHMETABOLIC_PSY_ALL_CORE_FT
BMI: BMI (kg/m2): 32.9 (07-09-21 @ 20:36)  HbA1c: A1C with Estimated Average Glucose Result: 5.3 % (07-13-21 @ 10:05)    Glucose:   BP: --  Lipid Panel: Date/Time: 07-13-21 @ 10:05  Cholesterol, Serum: 166  Direct LDL: --  HDL Cholesterol, Serum: 35  Total Cholesterol/HDL Ration Measurement: --  Triglycerides, Serum: 285  
BMI: BMI (kg/m2): 32.9 (07-09-21 @ 20:36)  HbA1c: A1C with Estimated Average Glucose Result: 5.3 % (07-13-21 @ 10:05)    Glucose:   BP: --  Lipid Panel: Date/Time: 07-13-21 @ 10:05  Cholesterol, Serum: 166  Direct LDL: --  HDL Cholesterol, Serum: 35  Total Cholesterol/HDL Ration Measurement: --  Triglycerides, Serum: 285  
BMI: BMI (kg/m2): 32.9 (07-09-21 @ 20:36)  HbA1c:   Glucose:   BP: --  Lipid Panel: 
BMI: BMI (kg/m2): 32.9 (07-09-21 @ 20:36)  HbA1c:   Glucose:   BP: --  Lipid Panel: 
BMI: BMI (kg/m2): 32.9 (07-09-21 @ 20:36)  HbA1c: A1C with Estimated Average Glucose Result: 5.3 % (07-13-21 @ 10:05)    Glucose:   BP: --  Lipid Panel: Date/Time: 07-13-21 @ 10:05  Cholesterol, Serum: 166  Direct LDL: --  HDL Cholesterol, Serum: 35  Total Cholesterol/HDL Ration Measurement: --  Triglycerides, Serum: 285  
BMI: BMI (kg/m2): 32.9 (07-09-21 @ 20:36)  HbA1c:   Glucose:   BP: --  Lipid Panel: 
BMI: BMI (kg/m2): 32.9 (07-09-21 @ 20:36)  HbA1c: A1C with Estimated Average Glucose Result: 5.3 % (07-13-21 @ 10:05)    Glucose:   BP: --  Lipid Panel: Date/Time: 07-13-21 @ 10:05  Cholesterol, Serum: 166  Direct LDL: --  HDL Cholesterol, Serum: 35  Total Cholesterol/HDL Ration Measurement: --  Triglycerides, Serum: 285  
BMI: BMI (kg/m2): 32.9 (07-09-21 @ 20:36)  HbA1c: A1C with Estimated Average Glucose Result: 5.3 % (07-13-21 @ 10:05)    Glucose:   BP: 112/83 (07-16-21 @ 08:13) (112/83 - 112/83)  Lipid Panel: Date/Time: 07-13-21 @ 10:05  Cholesterol, Serum: 166  Direct LDL: --  HDL Cholesterol, Serum: 35  Total Cholesterol/HDL Ration Measurement: --  Triglycerides, Serum: 285  
BMI: BMI (kg/m2): 32.9 (07-09-21 @ 20:36)  HbA1c: A1C with Estimated Average Glucose Result: 5.3 % (07-13-21 @ 10:05)    Glucose:   BP: --  Lipid Panel: Date/Time: 07-13-21 @ 10:05  Cholesterol, Serum: 166  Direct LDL: --  HDL Cholesterol, Serum: 35  Total Cholesterol/HDL Ration Measurement: --  Triglycerides, Serum: 285

## 2021-07-20 NOTE — BH INPATIENT PSYCHIATRY PROGRESS NOTE - NSBHMSEKNOWHOW_PSY_ALL_CORE
Current Events

## 2021-07-20 NOTE — BH INPATIENT PSYCHIATRY DISCHARGE NOTE - HOSPITAL COURSE
To be completed. Hospitalization dates 7/9/21- 7/20/21  Admission note: “41 year old   male with at least 2 previous psychiatric admissions (last at [facility] in 8/2018) where he was diagnosed with schizophreniform disorder, has been non complaint with treatment, with a past medical history significant for HTN, no history of violence, no legal issues, lives with 18 year old daughter who presented to the [facility] ed for complaints of generalized body aches and headaches.  While in the waiting area, he was noticed to be acting bizarrely, making statements about the “holy spirit”.  When psychiatry assessed the patient and “asked what that was about, patient reports the “holy spirit talks to him, and is here with him, kneel down and pray.   He is vague about how they communicate to him, but patient report they speak to him”.  Immediate risk was minimized by inpatient admission to a safe environment with appropriate supervision. As per collateral from the patient’s daughter, the patient was presenting with worsening manic symptoms and agitation.   Behavior: Initially, the patient presented with symptoms as follows: hyperverbal, hyperreligious, grandiose, labile, tangential, overinclusive, intrusive with peers, poor concentration. Noted with delusional themes. He noted poor sleep (3 hours) for the past 2 weeks, increased energy (going to the gym, working fulltime/ overtime, talking care of his mother and family). Reported recent stress related to his mother’s poor health and being in hospice for terminal cancer. While inpatient, he denied SI/ HI intent and plan. He denied perceptual disturbances such as AH, VH, TH. Over time with treatment, the above symptoms were subsided with moderate improvement, though he was still religiously preoccupied, elevated at times and intrusive with others. The patient was able to maintain fair to good behavior on the unit and followed redirection. He was adherent to medication/ psychotherapy. The patient attended group therapy regularly and participated. No aggressive noted while inpatient.    Tx: The patient was started on Risperdal 1mg po at bedtime and titrated to 4mg po at bedtime. Clonazepam 1mg po twice a day was started for leann. Lithium 300mg po twice a day was started for mood stabilization and increased to 1200mg po at bedtime. Lithium level was 0.5. Recommend to continue lithium level monitor. Trazadone 100mg po at bedtime was used for insomnia. The patient tolerated this medication regimen and denied side effects. Medication education and adherence discussed and the patient verbalized understanding. Psychotherapy (CBT group/ individual) was offered while inpatient. Substance abuse treatment for alcohol was offered to the patient and he refused. Counseling on reduction and cessation rendered with patient.   Discharge medication: 2 weeks supply was prescribed.  -Risperidone 4 mg po at bedtime.    -Clonazepam 1mg po twice a day.   -Lithium 1200mg po at bedtime.      -Nicotine 21 mg/24 hr transdermal film, extended release 1 patch transdermal once a day     -Trazodone 100 mg oral tablet 1 tab(s) orally once a day (at bedtime)    Medical   -Atenolol 50 mg oral tablet 1 tab(s) orally once a day    -fenofibrate 145 mg oral tablet 1 tab(s) orally once a day    Plan: [facility] 23-Jul-2021 09:00 @ [phone].  On discharge: The patient was seen for follow up for bipolar disorder with presenting symptoms of leann, chart reviewed, and case discussed with treatment team.  No events reported overnight and no PRN medication required for agitation. He was visible on the unit and in fair to good behavioral control. He was less intrusive with peers and follows redirection. Somewhat religiously preoccupied. Patient had poor sleep last night due to being "nervous about leaving", though had adequate sleep within the past week. Appetite remains adequate. Denied SI/ HI, intent and plan. Denied perceptual disturbances such as AH, VH, TH. No delusions or paranoia noted. He was adherent to medication and denied side effects. Continued medication adherence encouraged. The patient was included in the treatment team meeting to discuss safety concerns, treatment plan and continued outpatient treatment. The patient verbalized understanding.  MSE: Level of Consciousness-Alert, General Appearance-No deformities present, Body Habitus-Average build, Hygiene-Good, Grooming-Good, Behavior-Cooperative, Eye Contact-Good, Relatedness-Fair, Impulse Control-Normal, Muscle Tone/Strength-Normal muscle tone/strength, Abnormal Movements-No abnormal movements, Gait/Station-Normal gait / station, Speech-Normal volume, rate, productivity, spontaneity and articulation, Mood-"normal" noted somewhat elevated, Affect Quality-Elevated, Affect Range-Full, Affect Congruence-Congruent, Thought Process-Linear, Overinclusive, Improving thought process, Thought Associations-Normal, Thought Content-Unremarkable, Perceptions-No abnormalities, Orientation-Oriented to time, place, person, situation, Attention/Concentration-Normal, Estimated Intelligence-Average, Recent Memory-Normal, Remote Memory-Normal, Fund of Knowledge-Normal, How Fund of Knowledge Assessed-Current Events, Language-No abnormalities noted, Judgment-Fair, Insight-Fair  Patient was provided with extensive psychoeducation on treatment options and motivational counseling targeting healthy lifestyle. Patient was instructed on actions for crisis situations, understood and agreed to follow instructions for handling crisis, including coming to ER or calling 911 should the patient or their family feel that they are in danger of hurting self or others. Patient also was given Suicide Prevention Lifeline number 1-240.937.5952 and provided with instructions on its use.   For further collateral information, please contact MERLIN Peña at [phone] or [phone], email: [email or web address].

## 2021-07-20 NOTE — BH INPATIENT PSYCHIATRY PROGRESS NOTE - NSTXMEDICDATETRGT_PSY_ALL_CORE
17-Jul-2021
21-Jul-2021
17-Jul-2021
21-Jul-2021
17-Jul-2021
21-Jul-2021
17-Jul-2021
20-Jul-2021
24-Jul-2021

## 2021-07-20 NOTE — BH INPATIENT PSYCHIATRY PROGRESS NOTE - NSBHCHARTREVIEWVS_PSY_A_CORE FT
Vital Signs Last 24 Hrs  T(C): 36.3 (10 Jul 2021 06:28), Max: 36.3 (09 Jul 2021 20:36)  T(F): 97.4 (10 Jul 2021 06:28), Max: 97.4 (10 Jul 2021 06:28)  HR: --  BP: --  BP(mean): --  RR: 16 (09 Jul 2021 20:36) (16 - 16)  SpO2: 100% (09 Jul 2021 20:36) (100% - 100%)
Vital Signs Last 24 Hrs  T(C): 36.3 (15 Jul 2021 14:37), Max: 36.4 (15 Jul 2021 06:32)  T(F): 97.4 (15 Jul 2021 14:37), Max: 97.6 (15 Jul 2021 06:32)  HR: --90  BP: --127/88  BP(mean): --  RR: 20 (14 Jul 2021 19:12) (20 - 20)  SpO2: 100% (14 Jul 2021 19:12) (100% - 100%)
Vital Signs Last 24 Hrs  T(C): 36.3 (12 Jul 2021 08:26), Max: 36.4 (11 Jul 2021 22:55)  T(F): 97.3 (12 Jul 2021 08:26), Max: 97.5 (11 Jul 2021 22:55)  HR: --87  BP: --123/86  BP(mean): --  RR: 99 (12 Jul 2021 06:10) (18 - 99)  SpO2: --
Vital Signs Last 24 Hrs  T(C): 36.4 (11 Jul 2021 06:25), Max: 36.8 (10 Jul 2021 18:53)  T(F): 97.6 (11 Jul 2021 06:25), Max: 98.3 (10 Jul 2021 18:53)  HR: --  BP: --  BP(mean): --  RR: 16 (10 Jul 2021 21:47) (16 - 16)  SpO2: 100% (10 Jul 2021 21:47) (100% - 100%)
Vital Signs Last 24 Hrs  T(C): 36.4 (19 Jul 2021 14:07), Max: 36.9 (18 Jul 2021 22:37)  T(F): 97.5 (19 Jul 2021 14:07), Max: 98.5 (18 Jul 2021 22:37)  HR: --91  BP: --121/73  BP(mean): --  RR: --  SpO2: --
Vital Signs Last 24 Hrs  T(C): 36.1 (13 Jul 2021 05:42), Max: 36.6 (12 Jul 2021 22:30)  T(F): 96.9 (13 Jul 2021 05:42), Max: 97.9 (12 Jul 2021 22:30)  HR: --79  BP: --106/72  BP(mean): --  RR: 18 (13 Jul 2021 09:05) (18 - 18)  SpO2: 98% (12 Jul 2021 22:30) (98% - 98%)
Vital Signs Last 24 Hrs  T(C): 36.2 (20 Jul 2021 06:04), Max: 36.4 (19 Jul 2021 14:07)  T(F): 97.2 (20 Jul 2021 06:04), Max: 97.5 (19 Jul 2021 14:07)  HR: --75  BP: --118/75  BP(mean): --  RR: 17 (19 Jul 2021 21:15) (16 - 17)  SpO2: 100% (19 Jul 2021 21:15) (100% - 100%)
Vital Signs Last 24 Hrs  T(C): 36.3 (14 Jul 2021 06:32), Max: 36.4 (13 Jul 2021 21:58)  T(F): 97.3 (14 Jul 2021 06:32), Max: 97.5 (13 Jul 2021 21:58)  HR: --84  BP: --116/74  BP(mean): --  RR: 20 (13 Jul 2021 21:58) (20 - 20)  SpO2: 100% (13 Jul 2021 21:58) (100% - 100%)
Vital Signs Last 24 Hrs  T(C): 36.2 (16 Jul 2021 06:09), Max: 36.3 (15 Jul 2021 14:37)  T(F): 97.1 (16 Jul 2021 06:09), Max: 97.4 (15 Jul 2021 14:37)  HR: 86 (16 Jul 2021 08:13) (86 - 86)  BP: 112/83 (16 Jul 2021 08:13) (112/83 - 112/83)  BP(mean): --  RR: 18 (15 Jul 2021 21:56) (18 - 18)  SpO2: 100% (15 Jul 2021 21:56) (100% - 100%)

## 2021-07-20 NOTE — BH INPATIENT PSYCHIATRY DISCHARGE NOTE - OTHER PAST PSYCHIATRIC HISTORY (INCLUDE DETAILS REGARDING ONSET, COURSE OF ILLNESS, INPATIENT/OUTPATIENT TREATMENT)
Pt is a 41 year old, ,  male, domiciled with 18 year old daughter and wife, pphx 2 previous hospitalizations (last at [facility] in 8/2018), diagnosed with schizophreniform disorder, has been non complaint with treatment, past medical history HTN, no history of violence, no legal issues, presented to the [place] ED for complaints of generalized body aches and  headaches. While in the waiting area, he was noticed to be acting bizarre, making statements about the “holy spirit”.  Pt assessed by psychiatry and admitted to [facility] for delusional thinking/thought disorder.

## 2021-07-20 NOTE — BH INPATIENT PSYCHIATRY PROGRESS NOTE - NSBHMSESPEECH_PSY_A_CORE
Abnormal as indicated, otherwise normal...
Normal volume, rate, productivity, spontaneity and articulation
Abnormal as indicated, otherwise normal...

## 2021-07-20 NOTE — BH INPATIENT PSYCHIATRY PROGRESS NOTE - NSCGISEVERILLNESS_PSY_ALL_CORE
5 = Markedly ill - intrusive symptoms that distinctly impair social/occupational function or cause intrusive levels of distress

## 2021-07-20 NOTE — BH INPATIENT PSYCHIATRY PROGRESS NOTE - NSTXPSYCHOINTERMD_PSY_ALL_CORE
Risperdal utilized. 
will start risperdal 1mg hs
Risperdal utilized. 

## 2021-07-20 NOTE — BH INPATIENT PSYCHIATRY PROGRESS NOTE - NSBHFUPINTERVALCCFT_PSY_A_CORE
"I need to go home today"
"I feel great, perfect, very happier than ever". 
F/u for leann
"everything is awesome"
"I need to be discharge as soon as possible"
"I miss my mom"
F/u for leann
"I need to see my mom"
Discharge management

## 2021-07-20 NOTE — BH INPATIENT PSYCHIATRY PROGRESS NOTE - NSCGIIMPROVESX_PSY_ALL_CORE
3 = Minimally improved - slightly better with little or no clinically meaningful reduction of symptoms.  Represents very little change in basic clinical status, level of care, or functional capacity.
3 = Minimally improved - slightly better with little or no clinically meaningful reduction of symptoms.  Represents very little change in basic clinical status, level of care, or functional capacity.
2 = Much improved - notably better with signficant reduction of symptoms; increase in the level of functioning but some symptoms remain
3 = Minimally improved - slightly better with little or no clinically meaningful reduction of symptoms.  Represents very little change in basic clinical status, level of care, or functional capacity.
3 = Minimally improved - slightly better with little or no clinically meaningful reduction of symptoms.  Represents very little change in basic clinical status, level of care, or functional capacity.

## 2021-07-20 NOTE — BH INPATIENT PSYCHIATRY PROGRESS NOTE - NSBHMSETHTCONTENT_PSY_A_CORE
Unremarkable
Delusions
Unremarkable
Delusions
Unremarkable
Unremarkable
Delusions
Delusions
Unremarkable

## 2021-07-20 NOTE — BH INPATIENT PSYCHIATRY PROGRESS NOTE - NSDCCRITERIA_PSY_ALL_CORE
Sx reduction, medication management, safety planning, milieu therapy, outpatient psychiatric care.
resolution of delusions and hallucinations
Sx reduction, medication management, safety planning, milieu therapy, outpatient psychiatric care.
Sx reduction, medication management, safety planning, milieu therapy, outpatient psychiatric care.
resolution of delusions and hallucinations
resolution of delusions and hallucinations
Sx reduction, medication management, safety planning, milieu therapy, outpatient psychiatric care.

## 2021-07-20 NOTE — BH INPATIENT PSYCHIATRY DISCHARGE NOTE - NSDCMRMEDTOKEN_GEN_ALL_CORE_FT
atenolol 50 mg oral tablet: 1 tab(s) orally once a day  clonazePAM 1 mg oral tablet: 1 tab(s) orally 2 times a day MDD:2mg  fenofibrate 145 mg oral tablet: 1 tab(s) orally once a day  lithium 600 mg oral capsule: 2 cap(s) orally once a day (at bedtime)   nicotine 21 mg/24 hr transdermal film, extended release: 1 patch transdermal once a day   risperiDONE 4 mg oral tablet: 1 tab(s) orally once a day (at bedtime)  traZODone 100 mg oral tablet: 1 tab(s) orally once a day (at bedtime)

## 2021-07-20 NOTE — BH INPATIENT PSYCHIATRY PROGRESS NOTE - NSTXDCOPLKINTERMD_PSY_ALL_CORE
Discharge planning and outpatient referrals in progress.

## 2021-07-20 NOTE — BH INPATIENT PSYCHIATRY PROGRESS NOTE - NSBHINPTBILLING_PSY_ALL_CORE
44595 - Inpatient Moderate Complexity
63814 - Inpatient Moderate Complexity
60266 - Inpatient Moderate Complexity
11300 - Inpatient Moderate Complexity
73114 - Inpatient Moderate Complexity
58827 - Hospital Discharge Day Management; 30 min or less
33281 - Inpatient Moderate Complexity
97546 - Inpatient Moderate Complexity
84388 - Inpatient Moderate Complexity

## 2021-07-20 NOTE — BH INPATIENT PSYCHIATRY PROGRESS NOTE - NSTXPSYCHODATETRGT_PSY_ALL_CORE
09-Jul-2021
20-Jul-2021
09-Jul-2021

## 2021-07-20 NOTE — BH INPATIENT PSYCHIATRY PROGRESS NOTE - NSTXMEDICGOAL_PSY_ALL_CORE
Take all medications as prescribed
Be able to describe the benefit of medication/treatment
Take all medications as prescribed
Take all medications as prescribed

## 2021-10-06 LAB — GLUCOSE BLDC GLUCOMTR-MCNC: 209 MG/DL — HIGH (ref 70–99)

## 2021-12-02 NOTE — ED BEHAVIORAL HEALTH ASSESSMENT NOTE - OTHER
[Cough] : cough [Fever] : no fever [Fatigue] : no fatigue [Sinus Problems] : no sinus problems [Chest Discomfort] : no chest discomfort [GERD] : no gerd [Myalgias] : no myalgias [Rash] : no rash [Anemia] : no anemia [Headache] : no headache [Anxiety] : no anxiety cousin family psychosis mother was diagnosed with stage 4 breast cancer in Feb 2018 internally preoccupied

## 2023-06-24 NOTE — BH DISCHARGE NOTE NURSING/SOCIAL WORK/PSYCH REHAB - NSDCPRGOAL_PSY_ALL_CORE
Statement Selected
Writer met with patient in order to discuss progress towards psychiatric rehabilitation goal upon discharge.  Pt has made fair progress towards goal, as patient has been compliant with medication and verbalized intent to remain compliant post discharge. Patient identified improved mood and improved sleep as benefit of medication compliance. Writer provided support.     On the unit, patient was visible, interacting with peers. Patient was observed to be intrusive at times, requiring redirection and psychoeducation from staff regarding boundaries. Patient states that he is going through a divorce at this time, however remains hopeful towards future. Writer provided support. Patient and writer completed safety planning form. Patient denies SI upon discharge.     Patient attended all psychiatric rehabilitation groups during current hospitalization. Patient was intrusive at times during groups requiring redirection, however was able to tolerate session structure.

## 2024-09-30 NOTE — BH PATIENT PROFILE - HAS THE PATIENT USED TOBACCO IN THE PAST 30 DAYS?
----- Message from Nurse Mckeon sent at 9/30/2024 12:57 PM CDT -----    ----- Message -----  From: Jalyn Hartman  Sent: 9/30/2024  12:49 PM CDT  To: [facility] Staff    Type:  Patient Returning Call    Who Called: pt   Who Left Message for Patient: pt   Does the patient know what this is regarding?:pt was calling to get time for her endo   Would the patient rather a call back or a response via [facility]? call  Best Call Back Number:[phone]  Additional Information: call back   Yes

## 2025-05-22 NOTE — ED ADULT NURSE NOTE - NSFALLRSKASSESASSIST_ED_ALL_ED
was informed.  
See telephone encounter reviewed lab results in details with patient provided information for endocrinologist for further evaluation and workup.  
no